# Patient Record
Sex: FEMALE | Race: WHITE | Employment: UNEMPLOYED | URBAN - METROPOLITAN AREA
[De-identification: names, ages, dates, MRNs, and addresses within clinical notes are randomized per-mention and may not be internally consistent; named-entity substitution may affect disease eponyms.]

---

## 2022-02-23 ENCOUNTER — TRANSCRIBE ORDER (OUTPATIENT)
Dept: SCHEDULING | Age: 67
End: 2022-02-23

## 2022-02-23 DIAGNOSIS — Z12.31 OTHER SCREENING MAMMOGRAM: Primary | ICD-10-CM

## 2022-02-23 DIAGNOSIS — M81.0 OSTEOPOROSIS: ICD-10-CM

## 2022-12-29 ENCOUNTER — TRANSCRIBE ORDER (OUTPATIENT)
Dept: SCHEDULING | Age: 67
End: 2022-12-29

## 2022-12-29 DIAGNOSIS — J45.909 ASTHMA: Primary | ICD-10-CM

## 2023-01-06 ENCOUNTER — HOSPITAL ENCOUNTER (OUTPATIENT)
Dept: CT IMAGING | Age: 68
End: 2023-01-06
Attending: INTERNAL MEDICINE
Payer: MEDICARE

## 2023-01-06 DIAGNOSIS — J45.909 ASTHMA: ICD-10-CM

## 2023-01-06 PROCEDURE — 71250 CT THORAX DX C-: CPT

## 2023-05-23 RX ORDER — LEVOTHYROXINE SODIUM 0.05 MG/1
50 TABLET ORAL
COMMUNITY

## 2023-05-23 RX ORDER — DIAZEPAM 5 MG/1
5 TABLET ORAL EVERY 8 HOURS PRN
COMMUNITY
Start: 2012-04-06

## 2023-05-23 RX ORDER — SPIRONOLACTONE 50 MG/1
50 TABLET, FILM COATED ORAL DAILY
COMMUNITY

## 2023-05-23 RX ORDER — FLUTICASONE PROPIONATE AND SALMETEROL 500; 50 UG/1; UG/1
1 POWDER RESPIRATORY (INHALATION) 2 TIMES DAILY
COMMUNITY

## 2023-05-23 RX ORDER — OXYCODONE HYDROCHLORIDE AND ACETAMINOPHEN 5; 325 MG/1; MG/1
1-2 TABLET ORAL EVERY 4 HOURS PRN
COMMUNITY
Start: 2012-04-06

## 2023-05-23 RX ORDER — ROPINIROLE 2 MG/1
2 TABLET, FILM COATED ORAL NIGHTLY
COMMUNITY

## 2023-05-23 RX ORDER — DULOXETIN HYDROCHLORIDE 60 MG/1
60 CAPSULE, DELAYED RELEASE ORAL 2 TIMES DAILY
COMMUNITY

## 2023-05-23 RX ORDER — FLUTICASONE PROPIONATE 50 MCG
SPRAY, SUSPENSION (ML) NASAL DAILY
COMMUNITY

## 2023-05-23 RX ORDER — PROMETHAZINE HYDROCHLORIDE 25 MG/1
25 TABLET ORAL EVERY 6 HOURS PRN
COMMUNITY
Start: 2012-04-06

## 2023-05-23 RX ORDER — MONTELUKAST SODIUM 10 MG/1
10 TABLET ORAL DAILY
COMMUNITY

## 2023-08-22 ENCOUNTER — HOSPITAL ENCOUNTER (OUTPATIENT)
Facility: HOSPITAL | Age: 68
Discharge: HOME | End: 2023-08-25
Payer: MEDICARE

## 2023-08-22 DIAGNOSIS — R22.1 NECK MASS: ICD-10-CM

## 2023-08-22 DIAGNOSIS — R59.1 LYMPHADENOPATHY: ICD-10-CM

## 2023-08-22 PROCEDURE — 76536 US EXAM OF HEAD AND NECK: CPT

## 2023-09-27 ENCOUNTER — APPOINTMENT (OUTPATIENT)
Facility: HOSPITAL | Age: 68
End: 2023-09-27
Payer: MEDICARE

## 2023-09-27 ENCOUNTER — HOSPITAL ENCOUNTER (EMERGENCY)
Facility: HOSPITAL | Age: 68
Discharge: HOME | End: 2023-09-27
Attending: STUDENT IN AN ORGANIZED HEALTH CARE EDUCATION/TRAINING PROGRAM
Payer: MEDICARE

## 2023-09-27 VITALS
HEART RATE: 68 BPM | HEIGHT: 67 IN | TEMPERATURE: 98.7 F | RESPIRATION RATE: 14 BRPM | OXYGEN SATURATION: 97 % | SYSTOLIC BLOOD PRESSURE: 116 MMHG | BODY MASS INDEX: 35.94 KG/M2 | DIASTOLIC BLOOD PRESSURE: 68 MMHG | WEIGHT: 229 LBS

## 2023-09-27 DIAGNOSIS — S09.90XA CLOSED HEAD INJURY, INITIAL ENCOUNTER: Primary | ICD-10-CM

## 2023-09-27 DIAGNOSIS — S69.92XA HAND INJURY, LEFT, INITIAL ENCOUNTER: ICD-10-CM

## 2023-09-27 PROCEDURE — 73130 X-RAY EXAM OF HAND: CPT

## 2023-09-27 PROCEDURE — 99284 EMERGENCY DEPT VISIT MOD MDM: CPT

## 2023-09-27 PROCEDURE — 70450 CT HEAD/BRAIN W/O DYE: CPT

## 2023-09-27 PROCEDURE — 6360000002 HC RX W HCPCS

## 2023-09-27 PROCEDURE — 96372 THER/PROPH/DIAG INJ SC/IM: CPT

## 2023-09-27 RX ORDER — KETOROLAC TROMETHAMINE 15 MG/ML
15 INJECTION, SOLUTION INTRAMUSCULAR; INTRAVENOUS ONCE
Status: COMPLETED | OUTPATIENT
Start: 2023-09-27 | End: 2023-09-27

## 2023-09-27 RX ADMIN — KETOROLAC TROMETHAMINE 15 MG: 15 INJECTION, SOLUTION INTRAMUSCULAR; INTRAVENOUS at 20:58

## 2023-09-27 ASSESSMENT — PAIN SCALES - GENERAL
PAINLEVEL_OUTOF10: 2
PAINLEVEL_OUTOF10: 3
PAINLEVEL_OUTOF10: 6

## 2023-09-27 ASSESSMENT — PAIN DESCRIPTION - LOCATION: LOCATION: HEAD

## 2023-09-27 ASSESSMENT — PAIN - FUNCTIONAL ASSESSMENT: PAIN_FUNCTIONAL_ASSESSMENT: 0-10

## 2023-09-27 ASSESSMENT — PAIN DESCRIPTION - DESCRIPTORS: DESCRIPTORS: THROBBING

## 2023-09-27 NOTE — ED TRIAGE NOTES
Pt fell at the gym yesterday and hit her head on a brick wall. Pt has a headache today and left hand bruising with swelling. No LOC. Pt takes 81mg Asprin daily.

## 2023-09-28 NOTE — DISCHARGE INSTRUCTIONS
You have been seen tonight for head injury. Your imaging was reassuring and did not show evidence of acute intracranial abnormality. Additionally, your hand imaging was without evidence of fracture. You have been placed in an ace wrap for comfort. You may remove this as you like. Should you develop vision changes, severe headache, extremity weakness or numbness, loss of consciousness or not acting like yourself, please call 911 or present back to this department for re-evaluation.

## 2023-09-28 NOTE — ED NOTES
Discharge instructions provided. Pt verbalized understanding. Opportunity provided for questions. Pt discharged home.        Rachel Valadez RN  09/27/23 [id number]

## 2024-09-10 ENCOUNTER — HOSPITAL ENCOUNTER (OUTPATIENT)
Facility: HOSPITAL | Age: 69
Discharge: HOME | End: 2024-09-13
Payer: MEDICARE

## 2024-09-10 VITALS — HEIGHT: 67 IN | BODY MASS INDEX: 35.94 KG/M2 | WEIGHT: 229 LBS

## 2024-09-10 DIAGNOSIS — M85.80 SAPHO SYNDROME (HCC): ICD-10-CM

## 2024-09-10 DIAGNOSIS — Z78.0 MENOPAUSE: ICD-10-CM

## 2024-09-10 DIAGNOSIS — M65.9 SAPHO SYNDROME (HCC): ICD-10-CM

## 2024-09-10 DIAGNOSIS — L40.3 SAPHO SYNDROME (HCC): ICD-10-CM

## 2024-09-10 DIAGNOSIS — L70.9 SAPHO SYNDROME (HCC): ICD-10-CM

## 2024-09-10 DIAGNOSIS — M86.9 SAPHO SYNDROME (HCC): ICD-10-CM

## 2024-09-10 PROCEDURE — 77080 DXA BONE DENSITY AXIAL: CPT

## 2024-12-26 ENCOUNTER — HOSPITAL ENCOUNTER (INPATIENT)
Facility: HOSPITAL | Age: 69
LOS: 4 days | Discharge: HOME | DRG: 603 | End: 2024-12-30
Attending: EMERGENCY MEDICINE | Admitting: INTERNAL MEDICINE
Payer: MEDICARE

## 2024-12-26 ENCOUNTER — APPOINTMENT (OUTPATIENT)
Dept: VASCULAR SURGERY | Facility: HOSPITAL | Age: 69
DRG: 603 | End: 2024-12-26
Attending: EMERGENCY MEDICINE
Payer: MEDICARE

## 2024-12-26 DIAGNOSIS — L03.115 CELLULITIS OF RIGHT LOWER EXTREMITY: Primary | ICD-10-CM

## 2024-12-26 DIAGNOSIS — E87.1 HYPONATREMIA: ICD-10-CM

## 2024-12-26 LAB
ALBUMIN SERPL-MCNC: 3 G/DL (ref 3.5–5)
ALBUMIN/GLOB SERPL: 0.8 (ref 1.1–2.2)
ALP SERPL-CCNC: 113 U/L (ref 45–117)
ALT SERPL-CCNC: 31 U/L (ref 12–78)
ANION GAP SERPL CALC-SCNC: 4 MMOL/L (ref 2–12)
AST SERPL-CCNC: 35 U/L (ref 15–37)
BASOPHILS # BLD: 0.1 K/UL (ref 0–0.1)
BASOPHILS NFR BLD: 1 % (ref 0–1)
BILIRUB SERPL-MCNC: 0.4 MG/DL (ref 0.2–1)
BUN SERPL-MCNC: 8 MG/DL (ref 6–20)
BUN/CREAT SERPL: 11 (ref 12–20)
CALCIUM SERPL-MCNC: 8.8 MG/DL (ref 8.5–10.1)
CHLORIDE SERPL-SCNC: 98 MMOL/L (ref 97–108)
CO2 SERPL-SCNC: 26 MMOL/L (ref 21–32)
CREAT SERPL-MCNC: 0.73 MG/DL (ref 0.55–1.02)
DIFFERENTIAL METHOD BLD: ABNORMAL
EOSINOPHIL # BLD: 0.5 K/UL (ref 0–0.4)
EOSINOPHIL NFR BLD: 5 % (ref 0–7)
ERYTHROCYTE [DISTWIDTH] IN BLOOD BY AUTOMATED COUNT: 15.2 % (ref 11.5–14.5)
GLOBULIN SER CALC-MCNC: 3.8 G/DL (ref 2–4)
GLUCOSE SERPL-MCNC: 91 MG/DL (ref 65–100)
HCT VFR BLD AUTO: 35.4 % (ref 35–47)
HGB BLD-MCNC: 11.6 G/DL (ref 11.5–16)
IMM GRANULOCYTES # BLD AUTO: 0.1 K/UL (ref 0–0.04)
IMM GRANULOCYTES NFR BLD AUTO: 1 % (ref 0–0.5)
LACTATE SERPL-SCNC: 0.8 MMOL/L (ref 0.4–2)
LACTATE SERPL-SCNC: 1.2 MMOL/L (ref 0.4–2)
LYMPHOCYTES # BLD: 1.6 K/UL (ref 0.8–3.5)
LYMPHOCYTES NFR BLD: 14 % (ref 12–49)
MCH RBC QN AUTO: 29.8 PG (ref 26–34)
MCHC RBC AUTO-ENTMCNC: 32.8 G/DL (ref 30–36.5)
MCV RBC AUTO: 91 FL (ref 80–99)
MONOCYTES # BLD: 1.3 K/UL (ref 0–1)
MONOCYTES NFR BLD: 12 % (ref 5–13)
NEUTS SEG # BLD: 8 K/UL (ref 1.8–8)
NEUTS SEG NFR BLD: 67 % (ref 32–75)
NRBC # BLD: 0 K/UL (ref 0–0.01)
NRBC BLD-RTO: 0 PER 100 WBC
PLATELET # BLD AUTO: 437 K/UL (ref 150–400)
PMV BLD AUTO: 8.2 FL (ref 8.9–12.9)
POTASSIUM SERPL-SCNC: 4.1 MMOL/L (ref 3.5–5.1)
PROT SERPL-MCNC: 6.8 G/DL (ref 6.4–8.2)
RBC # BLD AUTO: 3.89 M/UL (ref 3.8–5.2)
SODIUM SERPL-SCNC: 128 MMOL/L (ref 136–145)
WBC # BLD AUTO: 11.5 K/UL (ref 3.6–11)

## 2024-12-26 PROCEDURE — 83605 ASSAY OF LACTIC ACID: CPT

## 2024-12-26 PROCEDURE — 6370000000 HC RX 637 (ALT 250 FOR IP): Performed by: EMERGENCY MEDICINE

## 2024-12-26 PROCEDURE — 2500000003 HC RX 250 WO HCPCS: Performed by: INTERNAL MEDICINE

## 2024-12-26 PROCEDURE — 6370000000 HC RX 637 (ALT 250 FOR IP): Performed by: INTERNAL MEDICINE

## 2024-12-26 PROCEDURE — 6360000002 HC RX W HCPCS: Performed by: INTERNAL MEDICINE

## 2024-12-26 PROCEDURE — 93971 EXTREMITY STUDY: CPT

## 2024-12-26 PROCEDURE — 36415 COLL VENOUS BLD VENIPUNCTURE: CPT

## 2024-12-26 PROCEDURE — 87040 BLOOD CULTURE FOR BACTERIA: CPT

## 2024-12-26 PROCEDURE — 85025 COMPLETE CBC W/AUTO DIFF WBC: CPT

## 2024-12-26 PROCEDURE — 99285 EMERGENCY DEPT VISIT HI MDM: CPT

## 2024-12-26 PROCEDURE — 6360000002 HC RX W HCPCS: Performed by: EMERGENCY MEDICINE

## 2024-12-26 PROCEDURE — 96365 THER/PROPH/DIAG IV INF INIT: CPT

## 2024-12-26 PROCEDURE — 1100000000 HC RM PRIVATE

## 2024-12-26 PROCEDURE — 80053 COMPREHEN METABOLIC PANEL: CPT

## 2024-12-26 PROCEDURE — 94761 N-INVAS EAR/PLS OXIMETRY MLT: CPT

## 2024-12-26 PROCEDURE — 2580000003 HC RX 258: Performed by: EMERGENCY MEDICINE

## 2024-12-26 RX ORDER — ACETAMINOPHEN 325 MG/1
650 TABLET ORAL EVERY 6 HOURS PRN
Status: DISCONTINUED | OUTPATIENT
Start: 2024-12-26 | End: 2024-12-30 | Stop reason: HOSPADM

## 2024-12-26 RX ORDER — VANCOMYCIN 1.75 GRAM/500 ML IN 0.9 % SODIUM CHLORIDE INTRAVENOUS
1750 EVERY 24 HOURS
Status: DISCONTINUED | OUTPATIENT
Start: 2024-12-27 | End: 2024-12-26

## 2024-12-26 RX ORDER — SODIUM CHLORIDE 0.9 % (FLUSH) 0.9 %
5-40 SYRINGE (ML) INJECTION EVERY 12 HOURS SCHEDULED
Status: DISCONTINUED | OUTPATIENT
Start: 2024-12-26 | End: 2024-12-30 | Stop reason: HOSPADM

## 2024-12-26 RX ORDER — POTASSIUM CHLORIDE 7.45 MG/ML
10 INJECTION INTRAVENOUS PRN
Status: DISCONTINUED | OUTPATIENT
Start: 2024-12-26 | End: 2024-12-30 | Stop reason: HOSPADM

## 2024-12-26 RX ORDER — ENOXAPARIN SODIUM 100 MG/ML
30 INJECTION SUBCUTANEOUS 2 TIMES DAILY
Status: DISCONTINUED | OUTPATIENT
Start: 2024-12-26 | End: 2024-12-30 | Stop reason: HOSPADM

## 2024-12-26 RX ORDER — OXYCODONE AND ACETAMINOPHEN 5; 325 MG/1; MG/1
1 TABLET ORAL
Status: COMPLETED | OUTPATIENT
Start: 2024-12-26 | End: 2024-12-26

## 2024-12-26 RX ORDER — DULOXETIN HYDROCHLORIDE 30 MG/1
60 CAPSULE, DELAYED RELEASE ORAL 2 TIMES DAILY
Status: DISCONTINUED | OUTPATIENT
Start: 2024-12-26 | End: 2024-12-30 | Stop reason: HOSPADM

## 2024-12-26 RX ORDER — LEVOTHYROXINE SODIUM 100 UG/1
100 TABLET ORAL
Status: DISCONTINUED | OUTPATIENT
Start: 2024-12-27 | End: 2024-12-30 | Stop reason: HOSPADM

## 2024-12-26 RX ORDER — SODIUM CHLORIDE 0.9 % (FLUSH) 0.9 %
5-40 SYRINGE (ML) INJECTION PRN
Status: DISCONTINUED | OUTPATIENT
Start: 2024-12-26 | End: 2024-12-30 | Stop reason: HOSPADM

## 2024-12-26 RX ORDER — CETIRIZINE HYDROCHLORIDE 10 MG/1
10 TABLET ORAL DAILY
Status: DISCONTINUED | OUTPATIENT
Start: 2024-12-26 | End: 2024-12-27

## 2024-12-26 RX ORDER — SODIUM CHLORIDE 9 MG/ML
INJECTION, SOLUTION INTRAVENOUS PRN
Status: DISCONTINUED | OUTPATIENT
Start: 2024-12-26 | End: 2024-12-30 | Stop reason: HOSPADM

## 2024-12-26 RX ORDER — MONTELUKAST SODIUM 10 MG/1
10 TABLET ORAL DAILY
Status: DISCONTINUED | OUTPATIENT
Start: 2024-12-26 | End: 2024-12-30 | Stop reason: HOSPADM

## 2024-12-26 RX ORDER — POLYETHYLENE GLYCOL 3350 17 G/17G
17 POWDER, FOR SOLUTION ORAL DAILY PRN
Status: DISCONTINUED | OUTPATIENT
Start: 2024-12-26 | End: 2024-12-30 | Stop reason: HOSPADM

## 2024-12-26 RX ORDER — ONDANSETRON 4 MG/1
4 TABLET, ORALLY DISINTEGRATING ORAL EVERY 8 HOURS PRN
Status: DISCONTINUED | OUTPATIENT
Start: 2024-12-26 | End: 2024-12-30 | Stop reason: HOSPADM

## 2024-12-26 RX ORDER — CARVEDILOL 12.5 MG/1
25 TABLET ORAL 2 TIMES DAILY WITH MEALS
Status: DISCONTINUED | OUTPATIENT
Start: 2024-12-26 | End: 2024-12-30 | Stop reason: HOSPADM

## 2024-12-26 RX ORDER — HYDROXYZINE HYDROCHLORIDE 25 MG/1
25 TABLET, FILM COATED ORAL 3 TIMES DAILY PRN
Status: DISCONTINUED | OUTPATIENT
Start: 2024-12-26 | End: 2024-12-27

## 2024-12-26 RX ORDER — ONDANSETRON 2 MG/ML
4 INJECTION INTRAMUSCULAR; INTRAVENOUS EVERY 6 HOURS PRN
Status: DISCONTINUED | OUTPATIENT
Start: 2024-12-26 | End: 2024-12-30 | Stop reason: HOSPADM

## 2024-12-26 RX ORDER — MAGNESIUM SULFATE IN WATER 40 MG/ML
2000 INJECTION, SOLUTION INTRAVENOUS PRN
Status: DISCONTINUED | OUTPATIENT
Start: 2024-12-26 | End: 2024-12-30 | Stop reason: HOSPADM

## 2024-12-26 RX ORDER — POTASSIUM CHLORIDE 750 MG/1
40 TABLET, EXTENDED RELEASE ORAL PRN
Status: DISCONTINUED | OUTPATIENT
Start: 2024-12-26 | End: 2024-12-30 | Stop reason: HOSPADM

## 2024-12-26 RX ORDER — HYDROCODONE BITARTRATE AND ACETAMINOPHEN 5; 325 MG/1; MG/1
1 TABLET ORAL EVERY 6 HOURS PRN
Status: DISCONTINUED | OUTPATIENT
Start: 2024-12-26 | End: 2024-12-27

## 2024-12-26 RX ORDER — LEVOTHYROXINE SODIUM 50 UG/1
50 TABLET ORAL
Status: DISCONTINUED | OUTPATIENT
Start: 2024-12-27 | End: 2024-12-26

## 2024-12-26 RX ORDER — 0.9 % SODIUM CHLORIDE 0.9 %
500 INTRAVENOUS SOLUTION INTRAVENOUS ONCE
Status: COMPLETED | OUTPATIENT
Start: 2024-12-26 | End: 2024-12-26

## 2024-12-26 RX ORDER — ACETAMINOPHEN 650 MG/1
650 SUPPOSITORY RECTAL EVERY 6 HOURS PRN
Status: DISCONTINUED | OUTPATIENT
Start: 2024-12-26 | End: 2024-12-30 | Stop reason: HOSPADM

## 2024-12-26 RX ADMIN — MONTELUKAST 10 MG: 10 TABLET, FILM COATED ORAL at 21:14

## 2024-12-26 RX ADMIN — OXYCODONE HYDROCHLORIDE AND ACETAMINOPHEN 1 TABLET: 5; 325 TABLET ORAL at 13:21

## 2024-12-26 RX ADMIN — Medication 2500 MG: at 14:35

## 2024-12-26 RX ADMIN — HYDROCODONE BITARTRATE AND ACETAMINOPHEN 1 TABLET: 5; 325 TABLET ORAL at 23:20

## 2024-12-26 RX ADMIN — SODIUM CHLORIDE, PRESERVATIVE FREE 10 ML: 5 INJECTION INTRAVENOUS at 21:53

## 2024-12-26 RX ADMIN — SODIUM CHLORIDE 500 ML: 9 INJECTION, SOLUTION INTRAVENOUS at 16:26

## 2024-12-26 RX ADMIN — CETIRIZINE HYDROCHLORIDE 10 MG: 10 TABLET, FILM COATED ORAL at 21:08

## 2024-12-26 RX ADMIN — CARVEDILOL 25 MG: 12.5 TABLET, FILM COATED ORAL at 21:14

## 2024-12-26 RX ADMIN — SODIUM CHLORIDE, PRESERVATIVE FREE 10 ML: 5 INJECTION INTRAVENOUS at 21:09

## 2024-12-26 RX ADMIN — DULOXETINE HYDROCHLORIDE 60 MG: 30 CAPSULE, DELAYED RELEASE PELLETS ORAL at 21:08

## 2024-12-26 RX ADMIN — HYDROCODONE BITARTRATE AND ACETAMINOPHEN 1 TABLET: 5; 325 TABLET ORAL at 17:41

## 2024-12-26 RX ADMIN — HYDROXYZINE HYDROCHLORIDE 25 MG: 25 TABLET ORAL at 21:09

## 2024-12-26 RX ADMIN — ENOXAPARIN SODIUM 30 MG: 100 INJECTION SUBCUTANEOUS at 21:09

## 2024-12-26 RX ADMIN — WATER 2000 MG: 1 INJECTION INTRAMUSCULAR; INTRAVENOUS; SUBCUTANEOUS at 21:47

## 2024-12-26 ASSESSMENT — PAIN SCALES - GENERAL
PAINLEVEL_OUTOF10: 8
PAINLEVEL_OUTOF10: 7
PAINLEVEL_OUTOF10: 0
PAINLEVEL_OUTOF10: 10
PAINLEVEL_OUTOF10: 8

## 2024-12-26 ASSESSMENT — PAIN DESCRIPTION - ONSET: ONSET: ON-GOING

## 2024-12-26 ASSESSMENT — PAIN - FUNCTIONAL ASSESSMENT
PAIN_FUNCTIONAL_ASSESSMENT: ACTIVITIES ARE NOT PREVENTED
PAIN_FUNCTIONAL_ASSESSMENT: 0-10

## 2024-12-26 ASSESSMENT — PAIN DESCRIPTION - DESCRIPTORS
DESCRIPTORS: ACHING;BURNING
DESCRIPTORS: ACHING

## 2024-12-26 ASSESSMENT — PAIN DESCRIPTION - PAIN TYPE: TYPE: CHRONIC PAIN;ACUTE PAIN

## 2024-12-26 ASSESSMENT — PAIN DESCRIPTION - LOCATION
LOCATION: LEG
LOCATION: LEG

## 2024-12-26 ASSESSMENT — PAIN DESCRIPTION - ORIENTATION
ORIENTATION: RIGHT;LEFT;LOWER
ORIENTATION: LOWER

## 2024-12-26 ASSESSMENT — PAIN DESCRIPTION - FREQUENCY: FREQUENCY: INTERMITTENT

## 2024-12-26 NOTE — H&P
Patient: Praveena Atwood   69 y.o. female   : 1955   MRN: [id number] Attending: Michelle Frye DO  CODE STATUS: Full code  PRIMARY CARE MD: Nohemi Barbosa, APRN - NP      ASSESSMENT & PLAN  Right leg cellulitis.  IV cefepime, IV vancomycin.  Request infectious disease specialist consultation.  Generalized itching.  Feel that excoriations may have led to the right leg cellulitis.  Continue home medication of cetirizine 10 mg p.o. daily.  Hydroxyzine 25 mg p.o. 3 times a day as needed for itching.  Hypertension.  Uncontrolled.  Patient is not on any blood pressure medicine at home.  Blood pressure is moderately improved after patient has been started on carvedilol 25 mg p.o. twice daily tonight.  Further blood pressure medicine may need to be added.  Hypothyroidism.  Continue home medication of levothyroxine 1 mcg p.o. daily.  Moderate hyponatremia, 128.  Suspect SIADH.  Patient is not being given any extraneous IV fluids.  Fluid restriction to 2 Liters/daily.      Chief Complaint:   Chief Complaint   Patient presents with    Leg Pain       History Gathered From: patient    History of Present Illness    Patient is a 69-year-old female who was just recently discharged from [facility] on [address] after two day hospitalization for right leg cellulitis. She states that she was getting IV vancomycin along with another IV medication at [facility]. She was discharged on doxycycline twice a day, and Sefton twice a day. She has been compliant to both medications. Yet, the redness on her right leg continues to spread, and the pain continues to increase. Patient states that her temperature was always normal at [facility]. Patient states that even though she did not have a fever, she has been having chills.    Patient states that she actually started having redness and weeping in the right leg about three weeks ago. She states that initially, she had splotchy redness, and

## 2024-12-26 NOTE — ED NOTES
TRANSFER - OUT REPORT:    Verbal report given to Yani on Praveena Atwood  being transferred to [facility] for routine progression of patient care       Report consisted of patient's Situation, Background, Assessment and   Recommendations(SBAR).     Information from the following report(s) ED Encounter Summary, ED SBAR, MAR, and Recent Results was reviewed with the receiving nurse.    Austin Fall Assessment:    Presents to emergency department  because of falls (Syncope, seizure, or loss of consciousness): No  Age > 70: No  Altered Mental Status, Intoxication with alcohol or substance confusion (Disorientation, impaired judgment, poor safety awaremess, or inability to follow instructions): No  Impaired Mobility: Ambulates or transfers with assistive devices or assistance; Unable to ambulate or transer.: No  Nursing Judgement: Yes          Lines:   Peripheral IV 12/26/24 Right Antecubital (Active)        Opportunity for questions and clarification was provided.

## 2024-12-26 NOTE — ED TRIAGE NOTES
Patient reports her right leg being infected.      Sunday night she was seen in  and admitted for two days due to this.  Reports since then the area has been getting worse despite taking her oral antibiotics.

## 2024-12-26 NOTE — ED PROVIDER NOTES
and then discharged,now its worse, reports chills but no fever, has been taking tylenol, has a history of peripheral vascular disease, on doxycycline and cefuroxime, has a history of DVT, not on blood thinners, had duplex and was negative    Amount and/or Complexity of Data Reviewed  Labs: ordered.    Risk  Prescription drug management.  Decision regarding hospitalization.            REASSESSMENT            CONSULTS:  None    PROCEDURES:  Unless otherwise noted below, none     Procedures    3:07 PM  Mild improvement after percocet        69-year-old female presents emergency department chief complaint of worsening cellulitic changes.  She was admitted recently to [facility] for cellulitis.  Duplex exam at that time was negative for DVT.  She was given IV antibiotics during her stay and discharged on cefuroxime and doxycycline.  Patient states that the cellulitis and pain has significantly worsened.  She denies any other symptoms.  She states she came to our facility as she felt she did not get adequate help at the other facility.  Leg is extremely erythematous, circumferential erythema, vasculitic changes also noted, weeping with some blister formation to the lateral aspect.  Obtained blood cultures, spoke with pharmacy and they agree with starting patient on vancomycin.  Patient was given a dose of Percocet.  Sodium mildly low at 128, white blood cell count 11.5, lactic acid 1.2, order . Duplex negative  Will admit patient      Perfect Serve Consult for Admission  3:21 PM    ED Room Number: ER02/02  Patient Name and age:  Praveena Atwood 69 y.o.  female  Working Diagnosis:   1. Cellulitis of right lower extremity    2. Hyponatremia        Perfect Serve Consult for Admission  3:22 PM    ED Room Number: ER02/02  Patient Name and age:  Praveena Atwood 69 y.o.  female  Working Diagnosis:   1. Cellulitis of right lower extremity    2. Hyponatremia        COVID-19 Suspicion: No  Sepsis present:  No

## 2024-12-27 PROBLEM — Z88.1 ALLERGY TO MULTIPLE ANTIBIOTICS: Status: ACTIVE | Noted: 2024-12-27

## 2024-12-27 PROBLEM — D72.829 LEUKOCYTOSIS: Status: ACTIVE | Noted: 2024-12-27

## 2024-12-27 PROBLEM — E66.9 OBESITY (BMI 30-39.9): Status: ACTIVE | Noted: 2024-12-27

## 2024-12-27 LAB
ANION GAP SERPL CALC-SCNC: 5 MMOL/L (ref 2–12)
BUN SERPL-MCNC: 9 MG/DL (ref 6–20)
BUN/CREAT SERPL: 14 (ref 12–20)
CALCIUM SERPL-MCNC: 8.4 MG/DL (ref 8.5–10.1)
CHLORIDE SERPL-SCNC: 101 MMOL/L (ref 97–108)
CO2 SERPL-SCNC: 24 MMOL/L (ref 21–32)
CREAT SERPL-MCNC: 0.66 MG/DL (ref 0.55–1.02)
ECHO BSA: 2.19 M2
GLUCOSE SERPL-MCNC: 100 MG/DL (ref 65–100)
POTASSIUM SERPL-SCNC: 3.8 MMOL/L (ref 3.5–5.1)
SODIUM SERPL-SCNC: 130 MMOL/L (ref 136–145)

## 2024-12-27 PROCEDURE — 2500000003 HC RX 250 WO HCPCS: Performed by: INTERNAL MEDICINE

## 2024-12-27 PROCEDURE — 6370000000 HC RX 637 (ALT 250 FOR IP): Performed by: STUDENT IN AN ORGANIZED HEALTH CARE EDUCATION/TRAINING PROGRAM

## 2024-12-27 PROCEDURE — 80048 BASIC METABOLIC PNL TOTAL CA: CPT

## 2024-12-27 PROCEDURE — 2580000003 HC RX 258: Performed by: INTERNAL MEDICINE

## 2024-12-27 PROCEDURE — 6360000002 HC RX W HCPCS: Performed by: INTERNAL MEDICINE

## 2024-12-27 PROCEDURE — 94761 N-INVAS EAR/PLS OXIMETRY MLT: CPT

## 2024-12-27 PROCEDURE — 6370000000 HC RX 637 (ALT 250 FOR IP): Performed by: NURSE PRACTITIONER

## 2024-12-27 PROCEDURE — 1100000000 HC RM PRIVATE

## 2024-12-27 PROCEDURE — 99223 1ST HOSP IP/OBS HIGH 75: CPT | Performed by: INTERNAL MEDICINE

## 2024-12-27 PROCEDURE — 6370000000 HC RX 637 (ALT 250 FOR IP): Performed by: INTERNAL MEDICINE

## 2024-12-27 RX ORDER — DIPHENHYDRAMINE HCL 25 MG
25 CAPSULE ORAL EVERY 6 HOURS PRN
Status: DISCONTINUED | OUTPATIENT
Start: 2024-12-27 | End: 2024-12-30 | Stop reason: HOSPADM

## 2024-12-27 RX ORDER — HYDROXYZINE HYDROCHLORIDE 25 MG/1
25 TABLET, FILM COATED ORAL 3 TIMES DAILY
Status: DISCONTINUED | OUTPATIENT
Start: 2024-12-27 | End: 2024-12-30 | Stop reason: HOSPADM

## 2024-12-27 RX ORDER — MORPHINE SULFATE 2 MG/ML
2 INJECTION, SOLUTION INTRAMUSCULAR; INTRAVENOUS EVERY 4 HOURS PRN
Status: DISPENSED | OUTPATIENT
Start: 2024-12-27 | End: 2024-12-30

## 2024-12-27 RX ORDER — CETIRIZINE HYDROCHLORIDE 10 MG/1
10 TABLET ORAL 2 TIMES DAILY
Status: DISCONTINUED | OUTPATIENT
Start: 2024-12-27 | End: 2024-12-30 | Stop reason: HOSPADM

## 2024-12-27 RX ORDER — OXYCODONE AND ACETAMINOPHEN 5; 325 MG/1; MG/1
1 TABLET ORAL EVERY 4 HOURS PRN
Status: DISPENSED | OUTPATIENT
Start: 2024-12-27 | End: 2024-12-30

## 2024-12-27 RX ADMIN — HYDROXYZINE HYDROCHLORIDE 25 MG: 25 TABLET ORAL at 05:03

## 2024-12-27 RX ADMIN — LEVOTHYROXINE SODIUM 100 MCG: 0.1 TABLET ORAL at 07:01

## 2024-12-27 RX ADMIN — SODIUM CHLORIDE, PRESERVATIVE FREE 10 ML: 5 INJECTION INTRAVENOUS at 21:12

## 2024-12-27 RX ADMIN — CETIRIZINE HYDROCHLORIDE 10 MG: 10 TABLET, FILM COATED ORAL at 09:46

## 2024-12-27 RX ADMIN — OXYCODONE HYDROCHLORIDE AND ACETAMINOPHEN 1 TABLET: 5; 325 TABLET ORAL at 13:58

## 2024-12-27 RX ADMIN — OXYCODONE HYDROCHLORIDE AND ACETAMINOPHEN 1 TABLET: 5; 325 TABLET ORAL at 09:58

## 2024-12-27 RX ADMIN — HYDROCODONE BITARTRATE AND ACETAMINOPHEN 1 TABLET: 5; 325 TABLET ORAL at 05:03

## 2024-12-27 RX ADMIN — HYDROXYZINE HYDROCHLORIDE 25 MG: 25 TABLET ORAL at 15:47

## 2024-12-27 RX ADMIN — CETIRIZINE HYDROCHLORIDE 10 MG: 10 TABLET, FILM COATED ORAL at 21:12

## 2024-12-27 RX ADMIN — DIPHENHYDRAMINE HYDROCHLORIDE 25 MG: 25 CAPSULE ORAL at 21:12

## 2024-12-27 RX ADMIN — CARVEDILOL 25 MG: 12.5 TABLET, FILM COATED ORAL at 09:46

## 2024-12-27 RX ADMIN — DULOXETINE HYDROCHLORIDE 60 MG: 30 CAPSULE, DELAYED RELEASE PELLETS ORAL at 09:46

## 2024-12-27 RX ADMIN — CARVEDILOL 25 MG: 12.5 TABLET, FILM COATED ORAL at 17:56

## 2024-12-27 RX ADMIN — ENOXAPARIN SODIUM 30 MG: 100 INJECTION SUBCUTANEOUS at 09:47

## 2024-12-27 RX ADMIN — MONTELUKAST 10 MG: 10 TABLET, FILM COATED ORAL at 09:45

## 2024-12-27 RX ADMIN — ENOXAPARIN SODIUM 30 MG: 100 INJECTION SUBCUTANEOUS at 21:20

## 2024-12-27 RX ADMIN — SODIUM CHLORIDE 600 MG: 9 INJECTION INTRAMUSCULAR; INTRAVENOUS; SUBCUTANEOUS at 13:59

## 2024-12-27 RX ADMIN — DULOXETINE HYDROCHLORIDE 60 MG: 30 CAPSULE, DELAYED RELEASE PELLETS ORAL at 21:12

## 2024-12-27 RX ADMIN — SODIUM CHLORIDE, PRESERVATIVE FREE 5 ML: 5 INJECTION INTRAVENOUS at 09:47

## 2024-12-27 RX ADMIN — OXYCODONE HYDROCHLORIDE AND ACETAMINOPHEN 1 TABLET: 5; 325 TABLET ORAL at 17:56

## 2024-12-27 RX ADMIN — HYDROXYZINE HYDROCHLORIDE 25 MG: 25 TABLET ORAL at 21:12

## 2024-12-27 RX ADMIN — HYDROXYZINE HYDROCHLORIDE 25 MG: 25 TABLET ORAL at 09:46

## 2024-12-27 ASSESSMENT — PAIN SCALES - GENERAL
PAINLEVEL_OUTOF10: 8
PAINLEVEL_OUTOF10: 7
PAINLEVEL_OUTOF10: 0
PAINLEVEL_OUTOF10: 6
PAINLEVEL_OUTOF10: 9
PAINLEVEL_OUTOF10: 0
PAINLEVEL_OUTOF10: 0

## 2024-12-27 ASSESSMENT — PAIN DESCRIPTION - ORIENTATION
ORIENTATION: RIGHT;LEFT
ORIENTATION: RIGHT;LEFT

## 2024-12-27 ASSESSMENT — PAIN DESCRIPTION - DESCRIPTORS
DESCRIPTORS: ACHING
DESCRIPTORS: SORE

## 2024-12-27 ASSESSMENT — PAIN DESCRIPTION - LOCATION
LOCATION: LEG

## 2024-12-27 ASSESSMENT — PAIN DESCRIPTION - PAIN TYPE: TYPE: CHRONIC PAIN;ACUTE PAIN

## 2024-12-27 ASSESSMENT — PAIN - FUNCTIONAL ASSESSMENT: PAIN_FUNCTIONAL_ASSESSMENT: ACTIVITIES ARE NOT PREVENTED

## 2024-12-27 ASSESSMENT — PAIN DESCRIPTION - FREQUENCY: FREQUENCY: INTERMITTENT

## 2024-12-27 ASSESSMENT — PAIN DESCRIPTION - ONSET: ONSET: ON-GOING

## 2024-12-27 NOTE — PROGRESS NOTES
[facility] Dosing Services: Antimicrobial Stewardship Daily Doc  Consult for antibiotic dosing of Vancomycin by Dr. Frye  Indication: cellulitis  Day of Therapy: 1 of 7    Ht Readings from Last 1 Encounters:   12/26/24 1.676 m (5' 6\")        Wt Readings from Last 1 Encounters:   12/26/24 102.7 kg (226 lb 6.6 oz)      Vancomycin therapy:  Loading dose: Vancomycin 2500 mg x1 dose now/given  Maintenance dose: Vancomycin 1750 mg IV every 24 hours   Dose calculated to approximate a           a. Target AUC/JAMEE of 400-600          b. Trough of 15-20 mcg/mL   Last level:  mcg/mL  Plan:   Dose administration notes:     Non-Kinetic Antimicrobial Dosing Regimen:   Current Regimen:    Recommendation:   Dose administration notes:     Other Antimicrobial   (not dosed by pharmacist) cefepime   Cultures    Serum Creatinine Lab Results   Component Value Date/Time    CREATININE 0.73 12/26/2024 01:06 PM          Creatinine Clearance Estimated Creatinine Clearance: 89 mL/min (based on SCr of 0.73 mg/dL).     Temp Temp: 98.6 °F (37 °C) (Oral)       WBC Lab Results   Component Value Date/Time    WBC 11.5 12/26/2024 01:06 PM          Procalcitonin No results found for: \"PROCAL\"   For Antifungals, Metronidazole and Nafcillin: Lab Results   Component Value Date/Time    ALT 31 12/26/2024 01:06 PM    AST 35 12/26/2024 01:06 PM        Pharmacist: Tadeo Harris  [phone]

## 2024-12-27 NOTE — PROGRESS NOTES
Hospitalist Progress Note      NAME:  Praveena Atwood   :  1955  MRM:  [id number]    Date/Time: 2024  11:43 AM           Assessment / Plan:       Patient is a 69-year-old female who was just recently discharged from [facility] on [address] after two day hospitalization for right leg cellulitis. She states that she was getting IV vancomycin along with another IV medication at [facility]. She was discharged on doxycycline twice a day, and Sefton twice a day. She has been compliant to both medications. Yet, the redness on her right leg continues to spread, and the pain continues to increase.     Right leg cellulitis: Was recently treated at [facility] with IV antibiotics including IV vancomycin, wound cultures were positive for Enterococcus faecalis and she was discharged on Doxy/Ceftin.  Now admitted with similar complaints and not much improvement with oral antibiotics.  Patient is started on IV vancomycin and cefepime.  ID is consulted.  Venous duplex negative for DVT.  Continue with IV pain medication as needed.    Right leg ulceration, generalized itching as well as blisters on the left leg: Suspect she may have underlying other dermatologic condition and will need biopsy which can be done as an outpatient.    Generalized itching:  Feel that excoriations may have led to the right leg cellulitis.  Continue home medication of cetirizine 10 mg p.o. daily.  Hydroxyzine 25 mg p.o. 3 times a day as needed for itching.    Hypertension.  Uncontrolled.  Patient is not on any blood pressure medicine at home.  Blood pressure is moderately improved after patient has been started on carvedilol 25 mg p.o. twice daily.  Further blood pressure medicine may need to be added.    Thrombocytosis: Likely reactive.  Continue to monitor.    Hypothyroidism.  Continue home medication of levothyroxine 1 mcg p.o. daily.    Moderate hyponatremia, 128.  Suspect SIADH.  Patient is not being

## 2024-12-27 NOTE — CARE COORDINATION
Care Management Progress Note    Reason for Admission:   Hyponatremia [E87.1]  Cellulitis of right lower extremity [L03.115]         Patient Admission Status: Inpatient  RUR:  7%  Hospitalization in the last 30 days (Readmission):  No        CM completed brief chart review.      CM will continue to follow pt from [facility] and will be available to assist with any discharge needs.  Rosemarie

## 2024-12-27 NOTE — PROGRESS NOTES
Per family, patient has hx of Delusional parasitosis, patient thinks she has parasites under her skin. She has long hx of this psychotic disorder and was previously stable on Abilify but was recently discontinue because of fall which was thought to be related to Abilify. Will get Psych consultation for further evaluation.

## 2024-12-27 NOTE — PROGRESS NOTES
[facility] Dosing Services: Antimicrobial Stewardship Daily Doc  Consult for antibiotic dosing of Vancomycin by Dr. Frye  Indication: Cellulitis  Day of Therapy: 2 of 7    Ht Readings from Last 1 Encounters:   12/26/24 1.676 m (5' 6\")        Wt Readings from Last 1 Encounters:   12/26/24 102.7 kg (226 lb 6.6 oz)      Vancomycin therapy:  Loading dose: Vancomycin 2500 mg x1 dose given 12/26 at 1435  Maintenance dose: Vancomycin 1750 mg IV every 24 hours start 12/27/24 at 10:00  Dose calculated to approximate a           a. Target AUC/JAMEE of 400-600          b. Trough of 15-20 mcg/mL     Last level: N/A mcg/mL    Plan:   - Continue vanc 1,750mg q24, predicted -> AUCss 472, Ctr 11.1, T1/2 11.8  - Level ordered for 12/29 with AM labs  - BMP & CBC ordered daily through 12/31    Other Antimicrobial   (not dosed by pharmacist) Cefepime 2,000mg q12   Cultures 12/26 Blood x2: NGTD - prelim   Serum Creatinine Lab Results   Component Value Date/Time    CREATININE 0.66 12/27/2024 04:49 AM      Creatinine Clearance Estimated Creatinine Clearance: 99 mL/min (based on SCr of 0.66 mg/dL).     Temp Temp: 97.5 °F (36.4 °C) (Oral)       WBC Lab Results   Component Value Date/Time    WBC 11.5 12/26/2024 01:06 PM      Procalcitonin No results found for: \"PROCAL\"   For Antifungals, Metronidazole and Nafcillin: Lab Results   Component Value Date/Time    ALT 31 12/26/2024 01:06 PM    AST 35 12/26/2024 01:06 PM        Byron Lima, GayD  [phone]

## 2024-12-28 LAB
-: NORMAL
ANION GAP SERPL CALC-SCNC: 7 MMOL/L (ref 2–12)
BACTERIA SPEC CULT: NORMAL
BACTERIA SPEC CULT: NORMAL
BASOPHILS # BLD: 0 K/UL (ref 0–0.1)
BASOPHILS NFR BLD: 0 % (ref 0–1)
BUN SERPL-MCNC: 8 MG/DL (ref 6–20)
BUN/CREAT SERPL: 12 (ref 12–20)
CALCIUM SERPL-MCNC: 8.8 MG/DL (ref 8.5–10.1)
CHLORIDE SERPL-SCNC: 106 MMOL/L (ref 97–108)
CK SERPL-CCNC: 76 U/L (ref 26–192)
CO2 SERPL-SCNC: 22 MMOL/L (ref 21–32)
CREAT SERPL-MCNC: 0.68 MG/DL (ref 0.55–1.02)
CRP SERPL-MCNC: 3.17 MG/DL (ref 0–0.3)
DIFFERENTIAL METHOD BLD: ABNORMAL
EOSINOPHIL # BLD: 0.8 K/UL (ref 0–0.4)
EOSINOPHIL NFR BLD: 13 % (ref 0–7)
ERYTHROCYTE [DISTWIDTH] IN BLOOD BY AUTOMATED COUNT: 15.1 % (ref 11.5–14.5)
GLUCOSE SERPL-MCNC: 96 MG/DL (ref 65–100)
HAV IGM SER QL: NONREACTIVE
HBV CORE IGM SER QL: NONREACTIVE
HBV SURFACE AG SER QL: 0.21 INDEX
HBV SURFACE AG SER QL: NEGATIVE
HCT VFR BLD AUTO: 36.6 % (ref 35–47)
HCV AB SER IA-ACNC: 0.28 INDEX
HCV AB SERPL QL IA: NONREACTIVE
HGB BLD-MCNC: 12.3 G/DL (ref 11.5–16)
IMM GRANULOCYTES # BLD AUTO: 0 K/UL (ref 0–0.04)
IMM GRANULOCYTES NFR BLD AUTO: 0 % (ref 0–0.5)
LYMPHOCYTES # BLD: 1.6 K/UL (ref 0.8–3.5)
LYMPHOCYTES NFR BLD: 25 % (ref 12–49)
MCH RBC QN AUTO: 30.2 PG (ref 26–34)
MCHC RBC AUTO-ENTMCNC: 33.6 G/DL (ref 30–36.5)
MCV RBC AUTO: 89.9 FL (ref 80–99)
MONOCYTES # BLD: 0.9 K/UL (ref 0–1)
MONOCYTES NFR BLD: 15 % (ref 5–13)
NEUTS SEG # BLD: 2.8 K/UL (ref 1.8–8)
NEUTS SEG NFR BLD: 47 % (ref 32–75)
NRBC # BLD: 0 K/UL (ref 0–0.01)
NRBC BLD-RTO: 0 PER 100 WBC
PLATELET # BLD AUTO: 414 K/UL (ref 150–400)
PMV BLD AUTO: 8 FL (ref 8.9–12.9)
POTASSIUM SERPL-SCNC: 3.7 MMOL/L (ref 3.5–5.1)
RBC # BLD AUTO: 4.07 M/UL (ref 3.8–5.2)
SERVICE CMNT-IMP: NORMAL
SODIUM SERPL-SCNC: 135 MMOL/L (ref 136–145)
WBC # BLD AUTO: 6.1 K/UL (ref 3.6–11)

## 2024-12-28 PROCEDURE — 1100000000 HC RM PRIVATE

## 2024-12-28 PROCEDURE — 6370000000 HC RX 637 (ALT 250 FOR IP): Performed by: INTERNAL MEDICINE

## 2024-12-28 PROCEDURE — 2500000003 HC RX 250 WO HCPCS: Performed by: INTERNAL MEDICINE

## 2024-12-28 PROCEDURE — 82550 ASSAY OF CK (CPK): CPT

## 2024-12-28 PROCEDURE — 2580000003 HC RX 258: Performed by: INTERNAL MEDICINE

## 2024-12-28 PROCEDURE — 99232 SBSQ HOSP IP/OBS MODERATE 35: CPT | Performed by: INTERNAL MEDICINE

## 2024-12-28 PROCEDURE — 6360000002 HC RX W HCPCS: Performed by: INTERNAL MEDICINE

## 2024-12-28 PROCEDURE — 80074 ACUTE HEPATITIS PANEL: CPT

## 2024-12-28 PROCEDURE — 80048 BASIC METABOLIC PNL TOTAL CA: CPT

## 2024-12-28 PROCEDURE — 6370000000 HC RX 637 (ALT 250 FOR IP): Performed by: STUDENT IN AN ORGANIZED HEALTH CARE EDUCATION/TRAINING PROGRAM

## 2024-12-28 PROCEDURE — 36415 COLL VENOUS BLD VENIPUNCTURE: CPT

## 2024-12-28 PROCEDURE — 6370000000 HC RX 637 (ALT 250 FOR IP): Performed by: NURSE PRACTITIONER

## 2024-12-28 PROCEDURE — 86140 C-REACTIVE PROTEIN: CPT

## 2024-12-28 PROCEDURE — 85025 COMPLETE CBC W/AUTO DIFF WBC: CPT

## 2024-12-28 PROCEDURE — 94761 N-INVAS EAR/PLS OXIMETRY MLT: CPT

## 2024-12-28 RX ADMIN — LEVOTHYROXINE SODIUM 100 MCG: 0.1 TABLET ORAL at 06:12

## 2024-12-28 RX ADMIN — CARVEDILOL 25 MG: 12.5 TABLET, FILM COATED ORAL at 18:03

## 2024-12-28 RX ADMIN — OXYCODONE HYDROCHLORIDE AND ACETAMINOPHEN 1 TABLET: 5; 325 TABLET ORAL at 14:33

## 2024-12-28 RX ADMIN — MONTELUKAST 10 MG: 10 TABLET, FILM COATED ORAL at 08:59

## 2024-12-28 RX ADMIN — OXYCODONE HYDROCHLORIDE AND ACETAMINOPHEN 1 TABLET: 5; 325 TABLET ORAL at 09:03

## 2024-12-28 RX ADMIN — CETIRIZINE HYDROCHLORIDE 10 MG: 10 TABLET, FILM COATED ORAL at 08:59

## 2024-12-28 RX ADMIN — ENOXAPARIN SODIUM 30 MG: 100 INJECTION SUBCUTANEOUS at 21:36

## 2024-12-28 RX ADMIN — HYDROXYZINE HYDROCHLORIDE 25 MG: 25 TABLET ORAL at 21:37

## 2024-12-28 RX ADMIN — SODIUM CHLORIDE, PRESERVATIVE FREE 10 ML: 5 INJECTION INTRAVENOUS at 09:00

## 2024-12-28 RX ADMIN — OXYCODONE HYDROCHLORIDE AND ACETAMINOPHEN 1 TABLET: 5; 325 TABLET ORAL at 21:37

## 2024-12-28 RX ADMIN — DIPHENHYDRAMINE HYDROCHLORIDE 25 MG: 25 CAPSULE ORAL at 18:03

## 2024-12-28 RX ADMIN — ENOXAPARIN SODIUM 30 MG: 100 INJECTION SUBCUTANEOUS at 09:00

## 2024-12-28 RX ADMIN — HYDROXYZINE HYDROCHLORIDE 25 MG: 25 TABLET ORAL at 09:12

## 2024-12-28 RX ADMIN — CETIRIZINE HYDROCHLORIDE 10 MG: 10 TABLET, FILM COATED ORAL at 21:36

## 2024-12-28 RX ADMIN — DIPHENHYDRAMINE HYDROCHLORIDE 25 MG: 25 CAPSULE ORAL at 11:56

## 2024-12-28 RX ADMIN — DULOXETINE HYDROCHLORIDE 60 MG: 30 CAPSULE, DELAYED RELEASE PELLETS ORAL at 21:36

## 2024-12-28 RX ADMIN — CAMPHOR, MENTHOL: .5; .5 LOTION TOPICAL at 14:32

## 2024-12-28 RX ADMIN — SODIUM CHLORIDE 600 MG: 9 INJECTION INTRAMUSCULAR; INTRAVENOUS; SUBCUTANEOUS at 18:06

## 2024-12-28 RX ADMIN — DULOXETINE HYDROCHLORIDE 60 MG: 30 CAPSULE, DELAYED RELEASE PELLETS ORAL at 08:59

## 2024-12-28 RX ADMIN — CARVEDILOL 25 MG: 12.5 TABLET, FILM COATED ORAL at 08:59

## 2024-12-28 RX ADMIN — SODIUM CHLORIDE, PRESERVATIVE FREE 10 ML: 5 INJECTION INTRAVENOUS at 21:38

## 2024-12-28 RX ADMIN — HYDROXYZINE HYDROCHLORIDE 25 MG: 25 TABLET ORAL at 14:32

## 2024-12-28 ASSESSMENT — PAIN SCALES - GENERAL
PAINLEVEL_OUTOF10: 5
PAINLEVEL_OUTOF10: 6
PAINLEVEL_OUTOF10: 7
PAINLEVEL_OUTOF10: 6
PAINLEVEL_OUTOF10: 6

## 2024-12-28 ASSESSMENT — PAIN DESCRIPTION - DESCRIPTORS
DESCRIPTORS: DISCOMFORT;TENDER
DESCRIPTORS: ACHING
DESCRIPTORS: ACHING

## 2024-12-28 ASSESSMENT — PAIN DESCRIPTION - LOCATION
LOCATION: LEG

## 2024-12-28 ASSESSMENT — PAIN DESCRIPTION - ORIENTATION
ORIENTATION: RIGHT
ORIENTATION: RIGHT;LEFT

## 2024-12-28 NOTE — BSMART NOTE
Initial [facility] Liaison Assessment Form     Section I - Integrated Summary    Reason for consult is: delusional parasitosis, skin excoriations, ulcerations, recurrent infections.    LOS:  2     Presenting problem/Summary:  Pt came to ED 12/26/24 c/o leg pain. Per family, Pt has Hx of delusional parasitosis, was previously stable on Abilify, and recently stopped d/t concerns it was related to her falls. Psychiatry consulted 12/27/24 d/t concerns for \"delusional parasitosis, skin excoriations, ulcerations, recurrent infections.\" Liaison met with Pt face to face on medical unit. Pt was alert, oriented and calm sitting up in bed. She denied SI, HI and AVH. She denied issues with appetite but reported poor sleep d/t itchy legs. Pt denied Hx of delusional parasitosis. Pt reported Hx of depression and is currently in Tx. She feels her depression is well managed but has some breakthrough symptoms currently d/t feeling bored in the hospital and missing out on her family [place] gathering today. Liaison supported Pt's emotional expression and reviewed coping skills together. Pt receptive to crosswords to help with boredom and distraction activities. Pt denied any other MH questions or concerns at this time.       The information is given by the patient and past medical records.  Current Psychiatrist and/or  is Dr. Dave", based in [place], through [employer] .  Previous Hospitalizations/Treatment: no    Plan: No indication for psychiatric admission. Refer to psychiatric consult note for further assessment and recommendation.     Lethality Assessment:  The potential for suicide is not noted.  The potential for homicide is not noted.  The patient has not been a perpetrator of sexual or physical abuse.    There are not pending charges.  The patient is not felt to be at risk for self-harm or harm to others.      Section II - Psychosocial  The patient's overall mood and attitude is calm and cooperative.

## 2024-12-28 NOTE — PROGRESS NOTES
Hospitalist Progress Note      NAME:  Praveena Atwood   :  1955  MRM:  [id number]    Date/Time: 2024  10:03 AM           Assessment / Plan:       Patient is a 69-year-old female who was just recently discharged from [facility] on [address] after two day hospitalization for right leg cellulitis. She states that she was getting IV vancomycin along with another IV medication at [facility]. She was discharged on doxycycline twice a day, and Sefton twice a day. She has been compliant to both medications. Yet, the redness on her right leg continues to spread, and the pain continues to increase.         Right leg cellulitis: Was recently treated at [facility] with IV antibiotics including IV vancomycin, wound cultures were positive for Enterococcus faecalis and she was discharged on Doxy/Ceftin.  Now admitted with similar complaints and not much improvement with oral antibiotics.  Patient is started on IV vancomycin .  Follow cultures.  ID is following.  Venous duplex negative for DVT.  Continue with IV pain medication as needed.    Right leg ulceration, generalized itching as well as blisters on the left leg: Suspect she may have underlying other dermatologic condition and will need biopsy which can be done as an outpatient.    Generalized itching:  Feel that excoriations may have led to the right leg cellulitis.  Continue home medication of cetirizine 10 mg p.o. daily.  Hydroxyzine 25 mg p.o. 3 times a day as needed for itching.    Formication/delusional parasitosis: Per family, patient has hx of some psychotic disorder and she feels like insects crawling under her skin that leads to severe itching/excoriations causing skin infections and ulcerations. She has long hx of this psychotic disorder and was previously stable on Abilify but was recently discontinued because of falls/balance issues which was thought to be related to Abilify. Will get Psych consultation for

## 2024-12-28 NOTE — CONSULTS
Chief complaint  \"I don't believe that there are parasites under my skin, it just feels that way because of all the itching.\"    History of present illness  Praveena Bower, a 69-year-old female, presented to the hospital with cellulitis on her right leg. She reports experiencing significant itching, which has been affecting her sleep. She mentioned that she only managed to get about two hours of sleep prior to receiving Benadryl, which helped alleviate the itching and allowed her to sleep better. Generally, she sleeps well at home, going to bed early around 9:00 or 10:00 PM and usually sleeping through the night. However, she had previously experienced frequent nighttime urination, which improved after consulting a urologist and starting a medication Gemtesa.    Praveena denies having any thoughts of self-harm or harm to others and does not experience hallucinations. She is currently taking Cymbalta, prescribed by her psychiatrist, \"Dr. Garza\", based in [place], through telehealth. She has been on Cymbalta for a long time. Was recently on Abilify to help augment her antidepressant but it was discontinued per her psychiatrist as Praveena experienced balance problems and involuntary movements, such as moving her right leg involuntarily while sitting in bed and difficulty keeping her right foot steady on pedals.     In addition to Cymbalta, Praveena takes Adderall for idiosyncratic daytime sleepiness, at a dosage of 10 mg, but only when she feels out of focus or sleepy in the afternoon. She describes a crawling sensation on her skin associated with the itching, which she attributes to the cellulitis, and clarifies that she does not believe there are parasites under her skin. This sensation has occurred during previous episodes of cellulitis, and she notes that it tends to resolve as the cellulitis improves.  .    Past Psychiatric and Medical History  - Hypertension  - Hypothyroidism  - Moderate hyponatremia as of December 
   Special Requests NO SPECIAL REQUESTS  RIGHT  Antecubital        Culture NO GROWTH AFTER 15 HOURS     CBC with Auto Differential    Collection Time: 12/26/24  1:06 PM   Result Value Ref Range    WBC 11.5 (H) 3.6 - 11.0 K/uL    RBC 3.89 3.80 - 5.20 M/uL    Hemoglobin 11.6 11.5 - 16.0 g/dL    Hematocrit 35.4 35.0 - 47.0 %    MCV 91.0 80.0 - 99.0 FL    MCH 29.8 26.0 - 34.0 PG    MCHC 32.8 30.0 - 36.5 g/dL    RDW 15.2 (H) 11.5 - 14.5 %    Platelets 437 (H) 150 - 400 K/uL    MPV 8.2 (L) 8.9 - 12.9 FL    Nucleated RBCs 0.0 0  WBC    nRBC 0.00 0.00 - 0.01 K/uL    Neutrophils % 67 32 - 75 %    Lymphocytes % 14 12 - 49 %    Monocytes % 12 5 - 13 %    Eosinophils % 5 0 - 7 %    Basophils % 1 0 - 1 %    Immature Granulocytes % 1 (H) 0.0 - 0.5 %    Neutrophils Absolute 8.0 1.8 - 8.0 K/UL    Lymphocytes Absolute 1.6 0.8 - 3.5 K/UL    Monocytes Absolute 1.3 (H) 0.0 - 1.0 K/UL    Eosinophils Absolute 0.5 (H) 0.0 - 0.4 K/UL    Basophils Absolute 0.1 0.0 - 0.1 K/UL    Immature Granulocytes Absolute 0.1 (H) 0.00 - 0.04 K/UL    Differential Type AUTOMATED     Comprehensive Metabolic Panel    Collection Time: 12/26/24  1:06 PM   Result Value Ref Range    Sodium 128 (L) 136 - 145 mmol/L    Potassium 4.1 3.5 - 5.1 mmol/L    Chloride 98 97 - 108 mmol/L    CO2 26 21 - 32 mmol/L    Anion Gap 4 2 - 12 mmol/L    Glucose 91 65 - 100 mg/dL    BUN 8 6 - 20 MG/DL    Creatinine 0.73 0.55 - 1.02 MG/DL    BUN/Creatinine Ratio 11 (L) 12 - 20      Est, Glom Filt Rate 90 >60 ml/min/1.73m2    Calcium 8.8 8.5 - 10.1 MG/DL    Total Bilirubin 0.4 0.2 - 1.0 MG/DL    ALT 31 12 - 78 U/L    AST 35 15 - 37 U/L    Alk Phosphatase 113 45 - 117 U/L    Total Protein 6.8 6.4 - 8.2 g/dL    Albumin 3.0 (L) 3.5 - 5.0 g/dL    Globulin 3.8 2.0 - 4.0 g/dL    Albumin/Globulin Ratio 0.8 (L) 1.1 - 2.2     Lactic Acid    Collection Time: 12/26/24  1:06 PM   Result Value Ref Range    Lactic Acid, Plasma 1.2 0.4 - 2.0 MMOL/L   Blood Culture 2    Collection Time:

## 2024-12-28 NOTE — PROGRESS NOTES
[facility] Progress Note  Jayesh Moreno DO  [phone] Office  [phone] Fax    2024      Assessment & Plan:     Right lower extremity cellulitis.  Enterococcus faecalis reportedly grew from the culture at [facility] however this would not be expected to cause a cellulitis.  There is no history of diabetes mellitus or immunosuppression predisposing her to gram-negative infection.  Slowly improving on daptomycin.   As long as she improves on this antibiotic then we will plan for discharge with a single dose of dalbavancin to be administered at the infusion center on the day of discharge.  Will ask case management to check for insurance coverage once we determine if she responds to the daptomycin.  Check MRSA screen  Right lower extremity ulcerations.  These do not appear to be consistent with cellulitis.  The patient would benefit from skin biopsy and dermatology evaluation  Suspected folliculitis involving upper thighs.  See images below.  Continue daptomycin.  Check MRSA screen.  Patient may benefit from MRSA decolonization regimen depending on culture results  Leukocytosis.  Resolved.  Thrombocytosis.  Likely reactive.  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  Psychiatry following  History of penicillin and sulfa allergies.  Patient able to tolerate cephalosporin antibiotics  Metabolic syndrome.  Will check hemoglobin A1c  Obesity.  BMI 36        Daptomycin    Subjective:     Leg improving    Objective:     Vitals: BP (!) 111/59   Pulse 65   Temp 97.7 °F (36.5 °C) (Oral)   Resp 16   Ht 1.676 m (5' 6\")   Wt 102.7 kg (226 lb 6.6 oz)   SpO2 98%   BMI 36.54 kg/m²      Tmax:  Temp (24hrs), Av.6 °F (36.4 °C), Min:96.8 °F (36 °C), Max:98.2 °F (36.8 °C)      Exam:   Patient is intubated:  no    Physical Examination:     General:  Alert, cooperative, no distress   Head:  Normocephalic, atraumatic.   Eyes:  Conjunctivae clear   Neck: Supple       Lungs:   No

## 2024-12-29 LAB
ANION GAP SERPL CALC-SCNC: 9 MMOL/L (ref 2–12)
BUN SERPL-MCNC: 15 MG/DL (ref 6–20)
BUN/CREAT SERPL: 19 (ref 12–20)
CALCIUM SERPL-MCNC: 9.1 MG/DL (ref 8.5–10.1)
CHLORIDE SERPL-SCNC: 99 MMOL/L (ref 97–108)
CO2 SERPL-SCNC: 21 MMOL/L (ref 21–32)
CREAT SERPL-MCNC: 0.79 MG/DL (ref 0.55–1.02)
ERYTHROCYTE [DISTWIDTH] IN BLOOD BY AUTOMATED COUNT: 14.4 % (ref 11.5–14.5)
GLUCOSE SERPL-MCNC: 86 MG/DL (ref 65–100)
HCT VFR BLD AUTO: 33.8 % (ref 35–47)
HGB BLD-MCNC: 11.3 G/DL (ref 11.5–16)
MCH RBC QN AUTO: 29.8 PG (ref 26–34)
MCHC RBC AUTO-ENTMCNC: 33.4 G/DL (ref 30–36.5)
MCV RBC AUTO: 89.2 FL (ref 80–99)
NRBC # BLD: 0 K/UL (ref 0–0.01)
NRBC BLD-RTO: 0 PER 100 WBC
PLATELET # BLD AUTO: 396 K/UL (ref 150–400)
PMV BLD AUTO: 8.3 FL (ref 8.9–12.9)
POTASSIUM SERPL-SCNC: 3.6 MMOL/L (ref 3.5–5.1)
RBC # BLD AUTO: 3.79 M/UL (ref 3.8–5.2)
SODIUM SERPL-SCNC: 129 MMOL/L (ref 136–145)
WBC # BLD AUTO: 8 K/UL (ref 3.6–11)

## 2024-12-29 PROCEDURE — 2580000003 HC RX 258: Performed by: INTERNAL MEDICINE

## 2024-12-29 PROCEDURE — 94761 N-INVAS EAR/PLS OXIMETRY MLT: CPT

## 2024-12-29 PROCEDURE — 2500000003 HC RX 250 WO HCPCS: Performed by: INTERNAL MEDICINE

## 2024-12-29 PROCEDURE — 80048 BASIC METABOLIC PNL TOTAL CA: CPT

## 2024-12-29 PROCEDURE — 6370000000 HC RX 637 (ALT 250 FOR IP): Performed by: INTERNAL MEDICINE

## 2024-12-29 PROCEDURE — 36415 COLL VENOUS BLD VENIPUNCTURE: CPT

## 2024-12-29 PROCEDURE — 6370000000 HC RX 637 (ALT 250 FOR IP): Performed by: STUDENT IN AN ORGANIZED HEALTH CARE EDUCATION/TRAINING PROGRAM

## 2024-12-29 PROCEDURE — 6360000002 HC RX W HCPCS: Performed by: STUDENT IN AN ORGANIZED HEALTH CARE EDUCATION/TRAINING PROGRAM

## 2024-12-29 PROCEDURE — 6360000002 HC RX W HCPCS: Performed by: INTERNAL MEDICINE

## 2024-12-29 PROCEDURE — 6370000000 HC RX 637 (ALT 250 FOR IP): Performed by: NURSE PRACTITIONER

## 2024-12-29 PROCEDURE — 99232 SBSQ HOSP IP/OBS MODERATE 35: CPT | Performed by: INTERNAL MEDICINE

## 2024-12-29 PROCEDURE — 1100000000 HC RM PRIVATE

## 2024-12-29 PROCEDURE — 85027 COMPLETE CBC AUTOMATED: CPT

## 2024-12-29 RX ADMIN — LEVOTHYROXINE SODIUM 100 MCG: 0.1 TABLET ORAL at 06:07

## 2024-12-29 RX ADMIN — MORPHINE SULFATE 2 MG: 2 INJECTION, SOLUTION INTRAMUSCULAR; INTRAVENOUS at 18:18

## 2024-12-29 RX ADMIN — CARVEDILOL 25 MG: 12.5 TABLET, FILM COATED ORAL at 16:49

## 2024-12-29 RX ADMIN — DIPHENHYDRAMINE HYDROCHLORIDE 25 MG: 25 CAPSULE ORAL at 06:07

## 2024-12-29 RX ADMIN — HYDROXYZINE HYDROCHLORIDE 25 MG: 25 TABLET ORAL at 08:04

## 2024-12-29 RX ADMIN — CARVEDILOL 25 MG: 12.5 TABLET, FILM COATED ORAL at 08:04

## 2024-12-29 RX ADMIN — MORPHINE SULFATE 2 MG: 2 INJECTION, SOLUTION INTRAMUSCULAR; INTRAVENOUS at 13:54

## 2024-12-29 RX ADMIN — ACETAMINOPHEN 650 MG: 325 TABLET ORAL at 16:49

## 2024-12-29 RX ADMIN — DIPHENHYDRAMINE HYDROCHLORIDE 25 MG: 25 CAPSULE ORAL at 21:47

## 2024-12-29 RX ADMIN — MONTELUKAST 10 MG: 10 TABLET, FILM COATED ORAL at 08:04

## 2024-12-29 RX ADMIN — HYDROXYZINE HYDROCHLORIDE 25 MG: 25 TABLET ORAL at 13:54

## 2024-12-29 RX ADMIN — OXYCODONE HYDROCHLORIDE AND ACETAMINOPHEN 1 TABLET: 5; 325 TABLET ORAL at 04:05

## 2024-12-29 RX ADMIN — CETIRIZINE HYDROCHLORIDE 10 MG: 10 TABLET, FILM COATED ORAL at 20:19

## 2024-12-29 RX ADMIN — HYDROXYZINE HYDROCHLORIDE 25 MG: 25 TABLET ORAL at 20:19

## 2024-12-29 RX ADMIN — OXYCODONE HYDROCHLORIDE AND ACETAMINOPHEN 1 TABLET: 5; 325 TABLET ORAL at 10:42

## 2024-12-29 RX ADMIN — ENOXAPARIN SODIUM 30 MG: 100 INJECTION SUBCUTANEOUS at 08:04

## 2024-12-29 RX ADMIN — CETIRIZINE HYDROCHLORIDE 10 MG: 10 TABLET, FILM COATED ORAL at 08:04

## 2024-12-29 RX ADMIN — DIPHENHYDRAMINE HYDROCHLORIDE 25 MG: 25 CAPSULE ORAL at 13:12

## 2024-12-29 RX ADMIN — DULOXETINE HYDROCHLORIDE 60 MG: 30 CAPSULE, DELAYED RELEASE PELLETS ORAL at 20:19

## 2024-12-29 RX ADMIN — ENOXAPARIN SODIUM 30 MG: 100 INJECTION SUBCUTANEOUS at 20:19

## 2024-12-29 RX ADMIN — SODIUM CHLORIDE 600 MG: 9 INJECTION INTRAMUSCULAR; INTRAVENOUS; SUBCUTANEOUS at 18:44

## 2024-12-29 RX ADMIN — DIPHENHYDRAMINE HYDROCHLORIDE 25 MG: 25 CAPSULE ORAL at 00:23

## 2024-12-29 RX ADMIN — SODIUM CHLORIDE, PRESERVATIVE FREE 10 ML: 5 INJECTION INTRAVENOUS at 20:17

## 2024-12-29 RX ADMIN — DULOXETINE HYDROCHLORIDE 60 MG: 30 CAPSULE, DELAYED RELEASE PELLETS ORAL at 08:04

## 2024-12-29 RX ADMIN — SODIUM CHLORIDE, PRESERVATIVE FREE 10 ML: 5 INJECTION INTRAVENOUS at 08:05

## 2024-12-29 ASSESSMENT — PAIN SCALES - GENERAL
PAINLEVEL_OUTOF10: 9
PAINLEVEL_OUTOF10: 5
PAINLEVEL_OUTOF10: 5
PAINLEVEL_OUTOF10: 3
PAINLEVEL_OUTOF10: 8
PAINLEVEL_OUTOF10: 7
PAINLEVEL_OUTOF10: 6

## 2024-12-29 ASSESSMENT — PAIN DESCRIPTION - ORIENTATION
ORIENTATION: RIGHT;LEFT

## 2024-12-29 ASSESSMENT — PAIN DESCRIPTION - LOCATION
LOCATION: LEG

## 2024-12-29 ASSESSMENT — PAIN DESCRIPTION - DESCRIPTORS
DESCRIPTORS: DULL
DESCRIPTORS: ACHING

## 2024-12-29 NOTE — PROGRESS NOTES
[facility]  [address] [place] [address]  [phone]    [facility]                                                                                          Hospitalist Progress Note  Shawna Burris MD        Date of Service:  2024  NAME:  Praveena Atwood  :  1955  MRN:  [id number]      Admission Summary:   Patient is a 69-year-old female who was just recently discharged from [facility] on [address] after two day hospitalization for right leg cellulitis. She states that she was getting IV vancomycin along with another IV medication at [facility]. She was discharged on doxycycline twice a day, and Sefton twice a day. She has been compliant to both medications. Yet, the redness on her right leg continues to spread, and the pain continues to increase.     Interval history / Subjective:   Patient reports that he pain and redness in her legs are slowly getting better.  Still with significant itching     Assessment & Plan:     Right leg cellulitis  -She was recently treated at  with IV antibiotics and returned due to worsening symptoms  -Patient is currently on IV daptomycin  -ID evaluated, recommends Dalvance on discharge  -No DVT on duplex    Right leg ulceration  Pruritus  -She may have underlying dermatologic condition  -For now IV Benadryl as needed  -Local wound care  -She was seen by psychiatry due to concern for formication/delusional parasitosis however this was ruled out    Hypertension  -Continue Coreg    Thrombocytosis  -Suspect reactive due to infection above  -Continue to monitor    Hypothyroidism  -Continue home meds    Hyponatremia  -suspect SIADH  -Continue with fluid restriction    Outisde Records, prior notes, labs, radiology, and medications reviewed       Code status: Full code  DVT prophylaxis: [facility] Problems             Last Modified POA    *

## 2024-12-29 NOTE — PROGRESS NOTES
Post Discharge PICC and Antibiotic Orders    1.  Diagnosis: RLE cellulitis  2.  Antibiotic: Dalvance 1500 mg IV x 1 dose  3.  Please pull IV once infusion has been completed  4.  Weekly labs: None  5.  Allergies:    Allergies   Allergen Reactions    Gluten Other (See Comments)    Sulfa Antibiotics Hives    Bacitracin Rash     mupricin topical and bacitracin topical     Penicillins Rash         Jayesh Moreno,

## 2024-12-29 NOTE — PROGRESS NOTES
[facility] Progress Note  Jayesh Moreno DO  [phone] Office  [phone] Fax    2024      Assessment & Plan:     Right lower extremity cellulitis.  Enterococcus faecalis reportedly grew from the culture at [facility] however this would not be expected to cause a cellulitis.  There is no history of diabetes mellitus or immunosuppression predisposing her to gram-negative infection.  Slowly improving on daptomycin.  Plan discharge on dalbavancin to be administered at infusion center .  Will ask case management to check patient prescription benefits through [facility].   Right lower extremity ulcerations/bilateral lower extremity petechial.  This does not appear to be consistent with cellulitis.  The patient would benefit from skin biopsy and dermatology evaluation.  Outpatient follow-up with [facility] wound care planned  Suspected folliculitis involving upper thighs.  See images below.  Improving.  MRSA screen negative   Leukocytosis.  Resolved.  Thrombocytosis.  Likely reactive.  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  Psychiatry following  History of penicillin and sulfa allergies.  Patient able to tolerate cephalosporin antibiotics  Metabolic syndrome.  Will check hemoglobin A1c  Obesity.  BMI 36        Daptomycin    Subjective:     Leg improving    Objective:     Vitals: BP (!) 149/74   Pulse 73   Temp 97.4 °F (36.3 °C) (Oral)   Resp 16   Ht 1.676 m (5' 6\")   Wt 102.7 kg (226 lb 6.6 oz)   SpO2 98%   BMI 36.54 kg/m²      Tmax:  Temp (24hrs), Av.5 °F (36.4 °C), Min:97.2 °F (36.2 °C), Max:98.2 °F (36.8 °C)      Exam:   Patient is intubated:  no    Physical Examination:   General:  Alert, cooperative, no distress   Head:  Normocephalic, atraumatic.   Eyes:  Conjunctivae clear   Neck: Supple       Lungs:   No distress.    Chest wall:     Heart:     Abdomen:   non-distended   Extremities: Moves all.    Skin: Bilateral lower extremity

## 2024-12-30 VITALS
SYSTOLIC BLOOD PRESSURE: 132 MMHG | WEIGHT: 226.41 LBS | HEART RATE: 72 BPM | HEIGHT: 66 IN | OXYGEN SATURATION: 100 % | RESPIRATION RATE: 16 BRPM | BODY MASS INDEX: 36.39 KG/M2 | DIASTOLIC BLOOD PRESSURE: 54 MMHG | TEMPERATURE: 97.7 F

## 2024-12-30 LAB
ANION GAP SERPL CALC-SCNC: 7 MMOL/L (ref 2–12)
BUN SERPL-MCNC: 14 MG/DL (ref 6–20)
BUN/CREAT SERPL: 19 (ref 12–20)
CALCIUM SERPL-MCNC: 8.6 MG/DL (ref 8.5–10.1)
CHLORIDE SERPL-SCNC: 109 MMOL/L (ref 97–108)
CO2 SERPL-SCNC: 21 MMOL/L (ref 21–32)
CREAT SERPL-MCNC: 0.72 MG/DL (ref 0.55–1.02)
ERYTHROCYTE [DISTWIDTH] IN BLOOD BY AUTOMATED COUNT: 14.6 % (ref 11.5–14.5)
GLUCOSE SERPL-MCNC: 93 MG/DL (ref 65–100)
HCT VFR BLD AUTO: 33.9 % (ref 35–47)
HGB BLD-MCNC: 11.3 G/DL (ref 11.5–16)
MCH RBC QN AUTO: 29.9 PG (ref 26–34)
MCHC RBC AUTO-ENTMCNC: 33.3 G/DL (ref 30–36.5)
MCV RBC AUTO: 89.7 FL (ref 80–99)
NRBC # BLD: 0 K/UL (ref 0–0.01)
NRBC BLD-RTO: 0 PER 100 WBC
PLATELET # BLD AUTO: 366 K/UL (ref 150–400)
PMV BLD AUTO: 8.3 FL (ref 8.9–12.9)
POTASSIUM SERPL-SCNC: 3.4 MMOL/L (ref 3.5–5.1)
RBC # BLD AUTO: 3.78 M/UL (ref 3.8–5.2)
SODIUM SERPL-SCNC: 137 MMOL/L (ref 136–145)
WBC # BLD AUTO: 6.9 K/UL (ref 3.6–11)

## 2024-12-30 PROCEDURE — 6370000000 HC RX 637 (ALT 250 FOR IP): Performed by: FAMILY MEDICINE

## 2024-12-30 PROCEDURE — 85027 COMPLETE CBC AUTOMATED: CPT

## 2024-12-30 PROCEDURE — 94761 N-INVAS EAR/PLS OXIMETRY MLT: CPT

## 2024-12-30 PROCEDURE — 6370000000 HC RX 637 (ALT 250 FOR IP): Performed by: STUDENT IN AN ORGANIZED HEALTH CARE EDUCATION/TRAINING PROGRAM

## 2024-12-30 PROCEDURE — 36415 COLL VENOUS BLD VENIPUNCTURE: CPT

## 2024-12-30 PROCEDURE — 99232 SBSQ HOSP IP/OBS MODERATE 35: CPT | Performed by: INTERNAL MEDICINE

## 2024-12-30 PROCEDURE — 6360000002 HC RX W HCPCS: Performed by: INTERNAL MEDICINE

## 2024-12-30 PROCEDURE — 6370000000 HC RX 637 (ALT 250 FOR IP): Performed by: INTERNAL MEDICINE

## 2024-12-30 PROCEDURE — 80048 BASIC METABOLIC PNL TOTAL CA: CPT

## 2024-12-30 PROCEDURE — 2500000003 HC RX 250 WO HCPCS: Performed by: INTERNAL MEDICINE

## 2024-12-30 RX ORDER — HYDROXYZINE HYDROCHLORIDE 25 MG/1
25 TABLET, FILM COATED ORAL EVERY 8 HOURS PRN
Qty: 20 TABLET | Refills: 0 | Status: SHIPPED | OUTPATIENT
Start: 2024-12-30 | End: 2025-01-09

## 2024-12-30 RX ORDER — POTASSIUM CHLORIDE 750 MG/1
40 TABLET, EXTENDED RELEASE ORAL ONCE
Status: COMPLETED | OUTPATIENT
Start: 2024-12-30 | End: 2024-12-30

## 2024-12-30 RX ORDER — CARVEDILOL 25 MG/1
25 TABLET ORAL 2 TIMES DAILY WITH MEALS
Qty: 60 TABLET | Refills: 3 | Status: SHIPPED | OUTPATIENT
Start: 2024-12-30

## 2024-12-30 RX ORDER — LEVOTHYROXINE SODIUM 100 UG/1
100 TABLET ORAL
Qty: 30 TABLET | Refills: 0 | Status: SHIPPED | OUTPATIENT
Start: 2024-12-31

## 2024-12-30 RX ADMIN — DULOXETINE HYDROCHLORIDE 60 MG: 30 CAPSULE, DELAYED RELEASE PELLETS ORAL at 09:39

## 2024-12-30 RX ADMIN — HYDROXYZINE HYDROCHLORIDE 25 MG: 25 TABLET ORAL at 09:39

## 2024-12-30 RX ADMIN — MONTELUKAST 10 MG: 10 TABLET, FILM COATED ORAL at 09:39

## 2024-12-30 RX ADMIN — CETIRIZINE HYDROCHLORIDE 10 MG: 10 TABLET, FILM COATED ORAL at 09:39

## 2024-12-30 RX ADMIN — SODIUM CHLORIDE, PRESERVATIVE FREE 10 ML: 5 INJECTION INTRAVENOUS at 09:39

## 2024-12-30 RX ADMIN — ACETAMINOPHEN 650 MG: 325 TABLET ORAL at 12:01

## 2024-12-30 RX ADMIN — ENOXAPARIN SODIUM 30 MG: 100 INJECTION SUBCUTANEOUS at 09:39

## 2024-12-30 RX ADMIN — CARVEDILOL 25 MG: 12.5 TABLET, FILM COATED ORAL at 09:39

## 2024-12-30 RX ADMIN — LEVOTHYROXINE SODIUM 100 MCG: 0.1 TABLET ORAL at 06:21

## 2024-12-30 RX ADMIN — POTASSIUM CHLORIDE 40 MEQ: 750 TABLET, EXTENDED RELEASE ORAL at 09:39

## 2024-12-30 ASSESSMENT — PAIN DESCRIPTION - LOCATION: LOCATION: LEG

## 2024-12-30 ASSESSMENT — PAIN SCALES - GENERAL
PAINLEVEL_OUTOF10: 0
PAINLEVEL_OUTOF10: 3

## 2024-12-30 ASSESSMENT — PAIN DESCRIPTION - DESCRIPTORS: DESCRIPTORS: ACHING

## 2024-12-30 NOTE — PROGRESS NOTES
[facility]  [address] [place] [address]  [phone]    [facility] Adult  Hospitalist Group                                                                                          Hospitalist Progress Note  Shawna Burris MD        Date of Service:  2024  NAME:  Praveena Atwood  :  1955  MRN:  [id number]      Admission Summary:   Patient is a 69-year-old female who was just recently discharged from [facility] on [address] after two day hospitalization for right leg cellulitis. She states that she was getting IV vancomycin along with another IV medication at [facility]. She was discharged on doxycycline twice a day, and Sefton twice a day. She has been compliant to both medications. Yet, the redness on her right leg continues to spread, and the pain continues to increase.     Interval history / Subjective:   Patient reports that he pain and redness in her legs are slowly getting better.  Still with significant itching     Assessment & Plan:     Right leg cellulitis  -She was recently treated at  with IV antibiotics and returned due to worsening symptoms  -Patient is currently on IV daptomycin  -ID evaluated, recommends Dalvance on discharge  -No DVT on duplex    Right leg ulceration  Pruritus  -She may have underlying dermatologic condition  -For now IV Benadryl as needed  -Local wound care  -She was seen by psychiatry due to concern for formication/delusional parasitosis however this was ruled out    Hypertension  -Continue Coreg    Thrombocytosis  -Suspect reactive due to infection above  -Continue to monitor    Hypothyroidism  -Continue home meds    Hyponatremia  -suspect SIADH  -Continue with fluid restriction    Outisde Records, prior notes, labs, radiology, and medications reviewed       Code status: Full code  DVT prophylaxis: [facility] Problems             Last Modified POA    *

## 2024-12-30 NOTE — DISCHARGE SUMMARY
[facility]  [address] [place] [address]  [phone]    [facility] Adult  Hospitalist Group     Discharge Summary       PATIENT ID: Praveena Atwood  MRN: [id number]   YOB: 1955    DATE OF ADMISSION: 12/26/2024  1:07 PM    DATE OF DISCHARGE: 12/30/24   PRIMARY CARE PROVIDER: Nohemi Barbosa, YAMEL - NP     DISCHARGING PROVIDER: Shawna Burris MD      CONSULTATIONS: IP CONSULT TO INFECTIOUS DISEASES  IP CONSULT TO PHARMACY  IP CONSULT TO PSYCHIATRY  IP CONSULT TO CASE MANAGEMENT    PROCEDURES/SURGERIES: * No surgery found *    ADMITTING DIAGNOSES & HOSPITAL COURSE:   Right leg cellulitis  -She was recently treated at  with IV antibiotics and returned due to worsening symptoms  -Patient is currently on IV daptomycin  -ID evaluated, recommends Dalvance on discharge but this was not covered, so she received some samples of Nuzyra from ID  -No DVT on duplex     Right leg ulceration  Pruritus  -She may have underlying dermatologic condition  -cont hydroxyzine as needed   -Local wound care  -She was seen by psychiatry due to concern for formication/delusional parasitosis however this was ruled out     Hypertension  -Continue Coreg     Thrombocytosis  -Suspect reactive due to infection above  -Continue to monitor     Hypothyroidism  -Continue home meds     Hyponatremia  -suspect SIADH  -Continue with fluid restriction           PENDING TEST RESULTS:   At the time of discharge the following test results are still pending: none    FOLLOW UP APPOINTMENTS:    No follow-up provider specified.       DIET: regular     ACTIVITY: as tolerated       DISCHARGE MEDICATIONS:     Medication List        START taking these medications      carvedilol 25 MG tablet  Commonly known as: COREG  Take 1 tablet by mouth 2 times daily (with meals)     hydrOXYzine HCl 25 MG tablet  Commonly known as: ATARAX  Take 1 tablet by mouth every 8 hours as needed for

## 2024-12-30 NOTE — PROGRESS NOTES
[facility] Progress Note  Jayesh Moreno DO  [phone] Office  [phone] Fax    2024      Assessment & Plan:     Right lower extremity cellulitis.  Enterococcus faecalis reportedly grew from the culture at [facility] however this would not be expected to cause a cellulitis.  There is no history of diabetes mellitus or immunosuppression predisposing her to gram-negative infection.  Slowly improving on daptomycin.  Plan discharge on dalbavancin to be administered at infusion center .  Will ask case management to check patient prescription benefits through [facility].   Right lower extremity ulcerations/bilateral lower extremity petechial.  This does not appear to be consistent with cellulitis.  The patient would benefit from skin biopsy and dermatology evaluation.  Outpatient follow-up with [facility] wound care planned  Suspected folliculitis involving upper thighs.  See images below.  Improving.  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  Psychiatry following  History of penicillin and sulfa allergies.  Patient able to tolerate cephalosporin antibiotics  Obesity.  BMI 36        Daptomycin    Subjective:     Leg improving    Objective:     Vitals: BP (!) 134/59   Pulse 89   Temp 97.7 °F (36.5 °C) (Oral)   Resp 16   Ht 1.676 m (5' 6\")   Wt 102.7 kg (226 lb 6.6 oz)   SpO2 97%   BMI 36.54 kg/m²      Tmax:  Temp (24hrs), Av.5 °F (36.4 °C), Min:97.3 °F (36.3 °C), Max:97.7 °F (36.5 °C)      Exam:   Patient is intubated:  no    Physical Examination:   General:  Alert, cooperative, no distress   Head:  Normocephalic, atraumatic.   Eyes:  Conjunctivae clear   Neck: Supple       Lungs:   No distress.    Chest wall:     Heart:     Abdomen:   non-distended   Extremities: Moves all.    Skin: Bilateral lower extremity petechial rash.  RLE ulcers stable.  Erythema improving.  Bilateral lower extremity edema   Neurologic: CNII-XII intact.

## 2024-12-30 NOTE — CARE COORDINATION
Care Management Progress Note    Reason for Admission:   Hyponatremia [E87.1]  Cellulitis of right lower extremity [L03.115]         Patient Admission Status: Inpatient  RUR:  6%  Hospitalization in the last 30 days (Readmission):  No        Transition Plan of Care:  ID following for medical management - pt will require Dalvance 1500 mg IV x one dose  CM sent a referral to [facility] to explore insurance benefits and reportedly, out of pocket cost to pt is $1358.  Pt would like an alternate antibiotic so Dr. Moreno was notified.   Outpatient follow up  Pt will arrange her own transport at discharge    CM will continue to follow pt until discharged.  Rosemarie

## 2025-01-01 ENCOUNTER — HOSPITAL ENCOUNTER (EMERGENCY)
Facility: HOSPITAL | Age: 70
Discharge: ANOTHER HOSPITAL | End: 2025-01-01
Attending: EMERGENCY MEDICINE
Payer: MEDICARE

## 2025-01-01 ENCOUNTER — APPOINTMENT (OUTPATIENT)
Facility: HOSPITAL | Age: 70
End: 2025-01-01
Payer: MEDICARE

## 2025-01-01 VITALS
TEMPERATURE: 97.4 F | SYSTOLIC BLOOD PRESSURE: 166 MMHG | DIASTOLIC BLOOD PRESSURE: 89 MMHG | HEART RATE: 72 BPM | OXYGEN SATURATION: 100 % | RESPIRATION RATE: 15 BRPM

## 2025-01-01 DIAGNOSIS — I77.6 VASCULITIS (HCC): Primary | ICD-10-CM

## 2025-01-01 LAB
ALBUMIN SERPL-MCNC: 2.6 G/DL (ref 3.5–5)
ALBUMIN/GLOB SERPL: 0.9 (ref 1.1–2.2)
ALP SERPL-CCNC: 72 U/L (ref 45–117)
ALT SERPL-CCNC: 23 U/L (ref 12–78)
ANION GAP SERPL CALC-SCNC: 6 MMOL/L (ref 2–12)
AST SERPL-CCNC: 35 U/L (ref 15–37)
BACTERIA SPEC CULT: NORMAL
BACTERIA SPEC CULT: NORMAL
BASOPHILS # BLD: 0 K/UL (ref 0–0.1)
BASOPHILS NFR BLD: 0 % (ref 0–1)
BILIRUB SERPL-MCNC: 1.2 MG/DL (ref 0.2–1)
BUN SERPL-MCNC: 10 MG/DL (ref 6–20)
BUN/CREAT SERPL: 11 (ref 12–20)
CALCIUM SERPL-MCNC: 8.4 MG/DL (ref 8.5–10.1)
CHLORIDE SERPL-SCNC: 98 MMOL/L (ref 97–108)
CO2 SERPL-SCNC: 22 MMOL/L (ref 21–32)
COMMENT:: NORMAL
CREAT SERPL-MCNC: 0.92 MG/DL (ref 0.55–1.02)
DIFFERENTIAL METHOD BLD: ABNORMAL
EOSINOPHIL # BLD: 0.2 K/UL (ref 0–0.4)
EOSINOPHIL NFR BLD: 3 % (ref 0–7)
ERYTHROCYTE [DISTWIDTH] IN BLOOD BY AUTOMATED COUNT: 15.1 % (ref 11.5–14.5)
GLOBULIN SER CALC-MCNC: 3 G/DL (ref 2–4)
GLUCOSE BLD STRIP.AUTO-MCNC: 106 MG/DL (ref 65–117)
GLUCOSE SERPL-MCNC: 117 MG/DL (ref 65–100)
HCT VFR BLD AUTO: 40.6 % (ref 35–47)
HGB BLD-MCNC: 13.3 G/DL (ref 11.5–16)
IMM GRANULOCYTES # BLD AUTO: 0 K/UL (ref 0–0.04)
IMM GRANULOCYTES NFR BLD AUTO: 1 % (ref 0–0.5)
LACTATE BLD-SCNC: 1.26 MMOL/L (ref 0.4–2)
LYMPHOCYTES # BLD: 0.9 K/UL (ref 0.8–3.5)
LYMPHOCYTES NFR BLD: 11 % (ref 12–49)
MCH RBC QN AUTO: 29.3 PG (ref 26–34)
MCHC RBC AUTO-ENTMCNC: 32.8 G/DL (ref 30–36.5)
MCV RBC AUTO: 89.4 FL (ref 80–99)
MONOCYTES # BLD: 0.8 K/UL (ref 0–1)
MONOCYTES NFR BLD: 9 % (ref 5–13)
NEUTS SEG # BLD: 6.7 K/UL (ref 1.8–8)
NEUTS SEG NFR BLD: 76 % (ref 32–75)
NRBC # BLD: 0 K/UL (ref 0–0.01)
NRBC BLD-RTO: 0 PER 100 WBC
PLATELET # BLD AUTO: 509 K/UL (ref 150–400)
PMV BLD AUTO: 8.3 FL (ref 8.9–12.9)
POTASSIUM SERPL-SCNC: 5.1 MMOL/L (ref 3.5–5.1)
PROCALCITONIN SERPL-MCNC: 0.09 NG/ML
PROT SERPL-MCNC: 5.6 G/DL (ref 6.4–8.2)
RBC # BLD AUTO: 4.54 M/UL (ref 3.8–5.2)
SERVICE CMNT-IMP: NORMAL
SODIUM SERPL-SCNC: 126 MMOL/L (ref 136–145)
SPECIMEN HOLD: NORMAL
TROPONIN I SERPL HS-MCNC: 8 NG/L (ref 0–51)
WBC # BLD AUTO: 8.6 K/UL (ref 3.6–11)

## 2025-01-01 PROCEDURE — 82962 GLUCOSE BLOOD TEST: CPT

## 2025-01-01 PROCEDURE — 87040 BLOOD CULTURE FOR BACTERIA: CPT

## 2025-01-01 PROCEDURE — 2580000003 HC RX 258: Performed by: EMERGENCY MEDICINE

## 2025-01-01 PROCEDURE — 36415 COLL VENOUS BLD VENIPUNCTURE: CPT

## 2025-01-01 PROCEDURE — 96360 HYDRATION IV INFUSION INIT: CPT

## 2025-01-01 PROCEDURE — 84484 ASSAY OF TROPONIN QUANT: CPT

## 2025-01-01 PROCEDURE — 85025 COMPLETE CBC W/AUTO DIFF WBC: CPT

## 2025-01-01 PROCEDURE — 84145 PROCALCITONIN (PCT): CPT

## 2025-01-01 PROCEDURE — 83605 ASSAY OF LACTIC ACID: CPT

## 2025-01-01 PROCEDURE — 71045 X-RAY EXAM CHEST 1 VIEW: CPT

## 2025-01-01 PROCEDURE — 93005 ELECTROCARDIOGRAM TRACING: CPT | Performed by: EMERGENCY MEDICINE

## 2025-01-01 PROCEDURE — 99285 EMERGENCY DEPT VISIT HI MDM: CPT

## 2025-01-01 PROCEDURE — 80053 COMPREHEN METABOLIC PANEL: CPT

## 2025-01-01 RX ORDER — 0.9 % SODIUM CHLORIDE 0.9 %
1000 INTRAVENOUS SOLUTION INTRAVENOUS ONCE
Status: COMPLETED | OUTPATIENT
Start: 2025-01-01 | End: 2025-01-01

## 2025-01-01 RX ADMIN — SODIUM CHLORIDE 1000 ML: 9 INJECTION, SOLUTION INTRAVENOUS at 12:31

## 2025-01-01 ASSESSMENT — LIFESTYLE VARIABLES
HOW MANY STANDARD DRINKS CONTAINING ALCOHOL DO YOU HAVE ON A TYPICAL DAY: PATIENT DOES NOT DRINK
HOW OFTEN DO YOU HAVE A DRINK CONTAINING ALCOHOL: NEVER

## 2025-01-01 ASSESSMENT — PAIN - FUNCTIONAL ASSESSMENT: PAIN_FUNCTIONAL_ASSESSMENT: 0-10

## 2025-01-01 ASSESSMENT — PAIN SCALES - GENERAL: PAINLEVEL_OUTOF10: 0

## 2025-01-01 NOTE — ED TRIAGE NOTES
Pt arrived via EMS   CC leg redness and swelling   Redness began about a month ago   3-4 days ago put on antibiotics redness and swelling is still spreading. Reports some itching. Pt reports feeling faint. Denies weakness in legs.   Reports chills,denies fevers  Denies pain.

## 2025-01-01 NOTE — ED NOTES
Took call from Transfer Center with update: [facility], [facility], [facility] and [facility] at capacity declined transfer.    Provider and Charge notified.

## 2025-01-01 NOTE — ED NOTES
Patient care handed off to me from Dr. Rain.  I personally evaluated the patient's.  Clinical concern for SJS versus severe vasculitis of the lower extremities.  Hemodynamically she is stable, does not appear to be septic.  We have canceled the antibiotic order.  Regarding disposition she will be transferred to [facility].  I discussed the case with Dr. Castle the burn surgeon who is agreeable to evaluate the patient for SJS versus vasculitis burn type symptoms.  I discussed the case with Dr. Sheth in the emergency department who accepts the patient for ER to ER transfer to [facility].     Hamzah Abreu MD  01/01/25 0240

## 2025-01-01 NOTE — ED PROVIDER NOTES
Findings: Rash present.   Neurological:      General: No focal deficit present.      Mental Status: She is alert.   Psychiatric:         Mood and Affect: Mood normal.                 DIAGNOSTIC RESULTS     RADIOLOGY:   Non-plain film images such as CT, Ultrasound and MRI are read by the radiologist. Plain radiographic images are visualized and preliminarily interpreted by the emergency physician with the below findings:        Interpretation per the Radiologist below, if available at the time of this note:    XR CHEST PORTABLE   Final Result   No acute cardiopulmonary disease.         Electronically signed by Barney Lemus MD            ED BEDSIDE ULTRASOUND:   Performed by ED Physician    LABS:  Labs Reviewed   CBC WITH AUTO DIFFERENTIAL - Abnormal; Notable for the following components:       Result Value    RDW 15.1 (*)     Platelets 509 (*)     MPV 8.3 (*)     Neutrophils % 76 (*)     Lymphocytes % 11 (*)     Immature Granulocytes % 1 (*)     All other components within normal limits   COMPREHENSIVE METABOLIC PANEL - Abnormal; Notable for the following components:    Sodium 126 (*)     Glucose 117 (*)     BUN/Creatinine Ratio 11 (*)     Calcium 8.4 (*)     Total Bilirubin 1.2 (*)     Total Protein 5.6 (*)     Albumin 2.6 (*)     Albumin/Globulin Ratio 0.9 (*)     All other components within normal limits   CULTURE, BLOOD 1   CULTURE, BLOOD 2   PROCALCITONIN   TROPONIN   POC LACTIC ACID   EXTRA TUBES HOLD   URINALYSIS WITH REFLEX TO CULTURE   EXTRA TUBE, BLOOD BANK   POCT GLUCOSE   POCT LACTIC ACID   POCT LACTIC ACID   POCT GLUCOSE       All other labs were unremarkable or not returned as of this dictation.    EMERGENCY DEPARTMENT COURSE and DIFFERENTIAL DIAGNOSIS/MDM:   Medical Decision Making  69-year-old female presents with bilateral lower extremity erythema.  She does have some rash on the bilateral upper extremities as well.  My concern is that she has some sort of vasculitis as opposed to cellulitis which she

## 2025-01-02 LAB
EKG ATRIAL RATE: 65 BPM
EKG DIAGNOSIS: NORMAL
EKG P AXIS: 45 DEGREES
EKG P-R INTERVAL: 182 MS
EKG Q-T INTERVAL: 448 MS
EKG QRS DURATION: 74 MS
EKG QTC CALCULATION (BAZETT): 465 MS
EKG R AXIS: 58 DEGREES
EKG T AXIS: 36 DEGREES
EKG VENTRICULAR RATE: 65 BPM

## 2025-01-02 PROCEDURE — 93010 ELECTROCARDIOGRAM REPORT: CPT | Performed by: INTERNAL MEDICINE

## 2025-01-05 LAB
BACTERIA SPEC CULT: NORMAL
BACTERIA SPEC CULT: NORMAL
SERVICE CMNT-IMP: NORMAL
SERVICE CMNT-IMP: NORMAL

## 2025-06-01 ENCOUNTER — OFFICE VISIT (OUTPATIENT)
Age: 70
End: 2025-06-01

## 2025-06-01 VITALS
HEART RATE: 74 BPM | RESPIRATION RATE: 13 BRPM | OXYGEN SATURATION: 98 % | WEIGHT: 215.4 LBS | BODY MASS INDEX: 34.62 KG/M2 | DIASTOLIC BLOOD PRESSURE: 90 MMHG | HEIGHT: 66 IN | TEMPERATURE: 97.4 F | SYSTOLIC BLOOD PRESSURE: 170 MMHG

## 2025-06-01 DIAGNOSIS — R03.0 ELEVATED BLOOD PRESSURE READING: Primary | ICD-10-CM

## 2025-06-01 DIAGNOSIS — Z48.02 ENCOUNTER FOR REMOVAL OF SUTURES: ICD-10-CM
